# Patient Record
Sex: MALE | Race: WHITE | NOT HISPANIC OR LATINO | Employment: OTHER | URBAN - METROPOLITAN AREA
[De-identification: names, ages, dates, MRNs, and addresses within clinical notes are randomized per-mention and may not be internally consistent; named-entity substitution may affect disease eponyms.]

---

## 2019-02-20 LAB — TSH SERPL-ACNC: 0.64 UIU/ML (ref 0.35–4.94)

## 2021-02-25 ENCOUNTER — TRANSFERRED RECORDS (OUTPATIENT)
Dept: HEALTH INFORMATION MANAGEMENT | Facility: CLINIC | Age: 61
End: 2021-02-25

## 2021-03-06 ENCOUNTER — TRANSFERRED RECORDS (OUTPATIENT)
Dept: HEALTH INFORMATION MANAGEMENT | Facility: CLINIC | Age: 61
End: 2021-03-06

## 2022-02-01 ENCOUNTER — TRANSFERRED RECORDS (OUTPATIENT)
Dept: HEALTH INFORMATION MANAGEMENT | Facility: CLINIC | Age: 62
End: 2022-02-01

## 2022-11-23 ENCOUNTER — TRANSFERRED RECORDS (OUTPATIENT)
Dept: HEALTH INFORMATION MANAGEMENT | Facility: CLINIC | Age: 62
End: 2022-11-23

## 2022-11-30 ENCOUNTER — TRANSFERRED RECORDS (OUTPATIENT)
Dept: HEALTH INFORMATION MANAGEMENT | Facility: CLINIC | Age: 62
End: 2022-11-30

## 2022-11-30 LAB — EJECTION FRACTION: NORMAL %

## 2022-12-13 ENCOUNTER — TRANSFERRED RECORDS (OUTPATIENT)
Dept: HEALTH INFORMATION MANAGEMENT | Facility: CLINIC | Age: 62
End: 2022-12-13

## 2022-12-21 ENCOUNTER — TRANSFERRED RECORDS (OUTPATIENT)
Dept: HEALTH INFORMATION MANAGEMENT | Facility: CLINIC | Age: 62
End: 2022-12-21

## 2023-02-06 LAB
CHOLESTEROL (EXTERNAL): 131 MG/DL (ref 140–200)
HDLC SERPL-MCNC: 62 MG/DL
LDL CHOLESTEROL CALCULATED (EXTERNAL): 57 MG/DL
TRIGLYCERIDES (EXTERNAL): 58 MG/DL

## 2023-03-01 LAB — HEP C HIM: NORMAL

## 2023-04-17 LAB
ALT SERPL-CCNC: 36 U/L (ref 0–55)
AST SERPL-CCNC: 28 U/L (ref 5–45)

## 2023-04-24 ENCOUNTER — TRANSFERRED RECORDS (OUTPATIENT)
Dept: HEALTH INFORMATION MANAGEMENT | Facility: CLINIC | Age: 63
End: 2023-04-24

## 2023-04-24 ENCOUNTER — MEDICAL CORRESPONDENCE (OUTPATIENT)
Dept: HEALTH INFORMATION MANAGEMENT | Facility: CLINIC | Age: 63
End: 2023-04-24

## 2023-04-24 LAB
GLUCOSE (EXTERNAL): 195 MG/DL (ref 70–100)
POTASSIUM (EXTERNAL): 4.4 MEQ/L (ref 3.6–5.4)

## 2023-04-25 ENCOUNTER — TRANSCRIBE ORDERS (OUTPATIENT)
Dept: OTHER | Age: 63
End: 2023-04-25

## 2023-04-25 ENCOUNTER — TELEPHONE (OUTPATIENT)
Dept: GASTROENTEROLOGY | Facility: CLINIC | Age: 63
End: 2023-04-25

## 2023-04-25 DIAGNOSIS — K86.1 CHRONIC PANCREATITIS (H): Primary | ICD-10-CM

## 2023-04-25 DIAGNOSIS — I48.0 PAF (PAROXYSMAL ATRIAL FIBRILLATION) (H): ICD-10-CM

## 2023-04-25 DIAGNOSIS — K86.1 CHRONIC PANCREATITIS, UNSPECIFIED PANCREATITIS TYPE (H): Primary | ICD-10-CM

## 2023-04-25 DIAGNOSIS — I10 HYPERTENSION, UNSPECIFIED TYPE: ICD-10-CM

## 2023-04-25 DIAGNOSIS — R73.9 BLOOD GLUCOSE ELEVATED: ICD-10-CM

## 2023-04-25 DIAGNOSIS — K86.3 PANCREATIC PSEUDOCYST: ICD-10-CM

## 2023-04-25 DIAGNOSIS — E11.9 TYPE 2 DIABETES MELLITUS WITHOUT COMPLICATION, WITHOUT LONG-TERM CURRENT USE OF INSULIN (H): ICD-10-CM

## 2023-04-25 DIAGNOSIS — Z79.01 LONG TERM (CURRENT) USE OF ANTICOAGULANTS: ICD-10-CM

## 2023-04-25 DIAGNOSIS — G62.9 NEUROPATHY: ICD-10-CM

## 2023-04-25 NOTE — TELEPHONE ENCOUNTER
Advanced Endoscopy     Referring provider:Referred by: Dr Elly Dawkins [facility]  [address]  [place] [address]  Phone: [phone], Fax: [phone]      Referred to: [facility]     Provider Requested:  Dr. Ribeiro       Referral Received: 04/25/23       Records received: none     Images received: none    Evaluation for: K86.1 (ICD-10-CM) - Chronic pancreatitis, unspecified pancreatitis type (H)     Clinical History (per RN review):     63 year old male with hx of necrotizing pancreatitis dating back to 2018, diabetes type 2, A-fib, on anticoagulation and history of reactive hepatitis.  Hospitalized February 23 to March 3, 2023 for pancreatic pseudocysts, LAMs placed for drainage       Recent hospitalization April 4 to April 7, 2023  [place] [facility] to remove LAMS, admitted afterwards for pain control    Patient currently on Creon 6000 units, 1 capsule by mouth 3 times a day. Patient currently reports pain worse after eating, pain otherwise manageable.      Last CT done 3/23/23 showing resolved pancreatic fluid collection no additional imaging completed since that time:      UPPER ABDOMEN       Liver and bile ducts: No focal liver lesion. Portal and hepatic veins are patent. No biliary dilatation.       Gallbladder: Prior cholecystectomy.       Pancreas: Stable changes of cyst gastrostomy involving a chronic pancreatic body collection. There is been interval decompression of the collection with resolution of intrinsic gas. Residual collection is ill-defined and difficult to measure however measures approximately 18 x 39 x 26 mm (2/59 and 301/43), previously 27 x 76 x 80 mm. Unchanged atrophy and mild chronic ductal dilatation distal to the collection. Mild chronic inflammation about the pancreatic head and body is improved from prior. Inflammation along the superior aspect of the collection is improved. Stable appearance of multiple prominent peripancreatic  lymph nodes.     EUS 2/23/23                                                                                Impression:        EGD        1 - Normal esophagus and flat regular Z-line.        2 - A localized area of extrinsic compression in the gastric body        (posterior wall). Likely related to the underlying pseudocyst.        3 - Mild erythematous gastropathy. Biopsied.        4 - A localized area of erythematous duodenopathy in the duodenal bulb.        Could be related to the underlying pseudocyst and pancreatitis.        5 - Normal second portion of the duodenum.        EUS        1 - This was unintentionally limited exam given the indication of the        procedure as well as the presence of the large pseudocyst obscuring the        view of the pancreas.        2 - A large, at least 100 mm by 68 mm, pseudocyst was seen. This was        treated successfully by placement of a 15 mm x 10 mm AXIOS LAMS to        create a cystogastrostomy. One 7 Fr double pigtail plastic stents were        then placed through the LAMS to maintain stent patency and to protect        the contralateral gastric and pseudocyst walls.     EGD to remove LAMS 4/4/23                                                                            Impression:        1 - Normal esophagus and flat regular Z-line.        2 - Complete resolution of the previously described pseudocyst which was        evaluated endoscopically. Given these findings, the LAMS and the plastic        stent were removed.        3 - Congested and erythematous duodenal mucosa. No duodenal stenosis or        gastric outlet obstruction.        4 - Normal second portion of the duodenum.        5 - Post procedure CXR showed no evidence of perforation or free        peritoneal air (Ordered given post procedure pain).     MD review date: 05/10/23 Dr. Julien YEPEZ Decision for clinic consultation/Orders:       Will see him in clinic first with a new CT with IV contrast  of abdomen      Referral updates/Patient contacted: 5/10

## 2023-05-01 ENCOUNTER — DOCUMENTATION ONLY (OUTPATIENT)
Dept: GASTROENTEROLOGY | Facility: CLINIC | Age: 63
End: 2023-05-01

## 2023-05-01 NOTE — PROGRESS NOTES
Called [facility] to request records pertaining to recent referral.     Records Requested:  Evaluation for: K86.1 (ICD-10-CM) - Chronic pancreatitis, unspecified pancreatitis type (H)    Facility Information:  Referred by: Dr Elly Gandhi   [facility]   [address]   [place] [address]   Phone: [phone]  Fax: [phone] sk

## 2023-05-15 ENCOUNTER — TELEPHONE (OUTPATIENT)
Dept: GASTROENTEROLOGY | Facility: CLINIC | Age: 63
End: 2023-05-15

## 2023-05-15 NOTE — TELEPHONE ENCOUNTER
[facility] Call Center    Phone Message    May a detailed message be left on voicemail: yes     Reason for Call: Order(s): Other:   Reason for requested: CT   Date needed: ASAP  Provider name: Dr. Victoria   Pt is requesting a call back from the team please to discuss having the imaging done closer to home. Please advise. Thank you!      Action Taken: Message routed to:  [facility] ([facility] Panc and Bili    Travel Screening: Not Applicable

## 2023-05-16 ENCOUNTER — DOCUMENTATION ONLY (OUTPATIENT)
Dept: GASTROENTEROLOGY | Facility: CLINIC | Age: 63
End: 2023-05-16

## 2023-05-16 NOTE — TELEPHONE ENCOUNTER
Returned call to patient. He requests that imaging order be sent to [facility] in [place], [place]. Request routed to clinic support pool.     Appointment with Dr. Victoria arranged for 7/20/23.    Aislinn Hilario RN Care Coordinator

## 2023-05-16 NOTE — PROGRESS NOTES
Faxed imaging order per Patient request.     Imaging ordered by Dr. Victoria on 05/10/23:  CT Abdomen w contrast    Note: Images due to be completed between July 1 - 14    Facility Information:  [facility]  [facility] [place] [address]  Phone #: [phone]  Radiology Fax #:  [phone]      SK

## 2023-05-17 ENCOUNTER — TELEPHONE (OUTPATIENT)
Dept: GASTROENTEROLOGY | Facility: CLINIC | Age: 63
End: 2023-05-17

## 2023-05-17 NOTE — TELEPHONE ENCOUNTER
Signature can be found in the middle of the first page of the order. Provider signed orders on 05/10/2023.    Re-sent fax to provided number.       SK

## 2023-05-17 NOTE — PROGRESS NOTES
Re-faxed imaging order per telephone encounter request. Cleo from [facility] ([phone]) called to state that they could not locate the provider signature on the order.     Signature can be found in the middle of the first page of the order. Provider signed orders on 05/10/2023.     Re-sent fax to provided number.      Imaging ordered by Dr. Victoria on 05/10/23:  CT Abdomen w contrast     Note: Images due to be completed between July 1 - 14     Facility Information:  [facility]  [address] [place] [address]  Phone #: [phone]  Radiology Fax #:  [phone]  Fax #: [phone]     SK

## 2023-05-17 NOTE — TELEPHONE ENCOUNTER
[facility] Call Center    Phone Message    May a detailed message be left on voicemail: yes     Reason for Call: Other: Cleo from [facility] [phone]  called to inform that she received a fax requesting a ct scan for pt, but she can't find where provider signed it. Does not say electronic signature just says ordered by Dr Hill. No Pre Auth with insurance was found either.  Please re fax to Fax# [phone], this fax goes directly to her.    Action Taken: Other: ramesh pabon    Travel Screening: Not Applicable

## 2023-05-18 NOTE — PROGRESS NOTES
Called Cleo from [facility] ([phone]) and confirmed receipt of order.      Cleo requested phone number for ordering nurse, as they need clarification. Provided direct dial.     Imaging ordered by Dr. Victoria on 05/10/23:  CT Abdomen w contrast     Note: Images due to be completed between July 1 - 14     Facility Information:  [facility]  [address], [place] [address]  Phone #: [phone]  Radiology Fax #:  [phone]  Fax #: [phone]     SK

## 2023-06-19 DIAGNOSIS — K86.1 CHRONIC PANCREATITIS (H): Primary | ICD-10-CM

## 2023-06-19 NOTE — PROGRESS NOTES
Request for an updated order for CT abdomen and pelvis with contrast per performing provider.     Order faxed to [facility] in [place], [place]. Fax: [phone]  Attn: Cleo Hilario RN Care Coordinator

## 2023-06-21 ENCOUNTER — DOCUMENTATION ONLY (OUTPATIENT)
Dept: GASTROENTEROLOGY | Facility: CLINIC | Age: 63
End: 2023-06-21

## 2023-06-21 NOTE — PROGRESS NOTES
Received call from Cleo from [facility] ([phone]) who confirmed receipt of order.     Patient is scheduled to complete order on 07-10-23.     Imaging ordered by Dr. Guru Victoria on 06/19/23:  CT Abdomen w contrast     Facility Information:  [facility]  [address] [place] [address]  Phone #: [phone]  Radiology Fax #:  [phone]  Fax #: [phone]        SK

## 2023-06-21 NOTE — PROGRESS NOTES
Called Cleo from [facility] ([phone]) to confirm receipt of imaging order.     Left message with call-back number.    Faxed orders on 06- with signature.     Imaging ordered by Dr. Guru Victoria on 06/19/23:  CT Abdomen w contrast     Facility Information:  [facility]  [address] [place] [address]  Phone #: [phone]  Radiology Fax #:  [phone]  Fax #: [phone]       SK

## 2023-06-26 LAB — HBA1C MFR BLD: 5 % (ref 4.3–5.6)

## 2023-07-10 ENCOUNTER — TRANSFERRED RECORDS (OUTPATIENT)
Dept: MULTI SPECIALTY CLINIC | Facility: CLINIC | Age: 63
End: 2023-07-10
Payer: COMMERCIAL

## 2023-07-10 LAB
CREATININE (EXTERNAL): 1.2 MG/DL (ref 0.66–1.25)
GFR ESTIMATED (EXTERNAL): 69 ML/MIN/1.73M2
INR (EXTERNAL): 2.5 (ref 0–3)

## 2023-07-10 NOTE — PROGRESS NOTES
Called  to request images be pushed to [employer] PACS.     Images Requested:  CT ABDOMEN PELVIS WITH CONTRAST (07/10/2023 9:51 AM CDT)     Facility Information:   Radiology    [address] [place] [address]    [phone]          SK

## 2023-07-13 ENCOUNTER — DOCUMENTATION ONLY (OUTPATIENT)
Dept: GASTROENTEROLOGY | Facility: CLINIC | Age: 63
End: 2023-07-13
Payer: COMMERCIAL

## 2023-07-13 NOTE — PROGRESS NOTES
Called PT and confirmed Appointment.    Called to remind patient of their upcoming appointment with our GI clinic, on 07/20/23 at 11:00 AM with Dr. Guru Victoria. This appointment is scheduled as an in-person appt. Please arrive 15 minutes early to check in for your appointment. , if your appointment is virtual (video or telephone) you need to be in [place] for the visit. To reschedule or cancel patient to call [phone].      SK

## 2023-07-20 ENCOUNTER — PREP FOR PROCEDURE (OUTPATIENT)
Dept: GASTROENTEROLOGY | Facility: CLINIC | Age: 63
End: 2023-07-20

## 2023-07-20 ENCOUNTER — OFFICE VISIT (OUTPATIENT)
Dept: GASTROENTEROLOGY | Facility: CLINIC | Age: 63
End: 2023-07-20
Payer: COMMERCIAL

## 2023-07-20 ENCOUNTER — CARE COORDINATION (OUTPATIENT)
Dept: GASTROENTEROLOGY | Facility: CLINIC | Age: 63
End: 2023-07-20

## 2023-07-20 VITALS
HEIGHT: 69 IN | HEART RATE: 59 BPM | BODY MASS INDEX: 28.64 KG/M2 | WEIGHT: 193.4 LBS | SYSTOLIC BLOOD PRESSURE: 133 MMHG | OXYGEN SATURATION: 96 % | DIASTOLIC BLOOD PRESSURE: 74 MMHG

## 2023-07-20 DIAGNOSIS — E11.9 TYPE 2 DIABETES MELLITUS WITHOUT COMPLICATION, WITHOUT LONG-TERM CURRENT USE OF INSULIN (H): ICD-10-CM

## 2023-07-20 DIAGNOSIS — G62.9 NEUROPATHY: ICD-10-CM

## 2023-07-20 DIAGNOSIS — I10 HYPERTENSION, UNSPECIFIED TYPE: ICD-10-CM

## 2023-07-20 DIAGNOSIS — I48.0 PAF (PAROXYSMAL ATRIAL FIBRILLATION) (H): ICD-10-CM

## 2023-07-20 DIAGNOSIS — K86.1 CHRONIC PANCREATITIS, UNSPECIFIED PANCREATITIS TYPE (H): ICD-10-CM

## 2023-07-20 DIAGNOSIS — Z79.01 LONG TERM (CURRENT) USE OF ANTICOAGULANTS: ICD-10-CM

## 2023-07-20 DIAGNOSIS — K86.3 PANCREATIC PSEUDOCYST: Primary | ICD-10-CM

## 2023-07-20 DIAGNOSIS — R73.9 BLOOD GLUCOSE ELEVATED: ICD-10-CM

## 2023-07-20 DIAGNOSIS — K86.3 PANCREATIC PSEUDOCYST: ICD-10-CM

## 2023-07-20 PROCEDURE — 99204 OFFICE O/P NEW MOD 45 MIN: CPT | Mod: GC | Performed by: INTERNAL MEDICINE

## 2023-07-20 RX ORDER — BLOOD SUGAR DIAGNOSTIC
1 STRIP MISCELLANEOUS
COMMUNITY
Start: 2023-05-30

## 2023-07-20 RX ORDER — WARFARIN SODIUM 5 MG/1
TABLET ORAL
Status: ON HOLD | COMMUNITY
End: 2023-08-07

## 2023-07-20 RX ORDER — DILTIAZEM HYDROCHLORIDE 120 MG/1
120 CAPSULE, COATED, EXTENDED RELEASE ORAL DAILY
COMMUNITY
Start: 2023-05-27

## 2023-07-20 RX ORDER — METFORMIN HCL 500 MG
500 TABLET, EXTENDED RELEASE 24 HR ORAL
COMMUNITY
Start: 2023-06-26

## 2023-07-20 RX ORDER — MAGNESIUM OXIDE 400 MG/1
400 TABLET ORAL DAILY
COMMUNITY

## 2023-07-20 RX ORDER — AMLODIPINE BESYLATE 2.5 MG/1
TABLET ORAL
COMMUNITY
Start: 2023-06-26

## 2023-07-20 RX ORDER — GABAPENTIN 600 MG/1
1200 TABLET ORAL
Status: ON HOLD | COMMUNITY
Start: 2023-06-26 | End: 2023-08-07

## 2023-07-20 RX ORDER — PANCRELIPASE 30000; 6000; 19000 [USP'U]/1; [USP'U]/1; [USP'U]/1
CAPSULE, DELAYED RELEASE PELLETS ORAL
COMMUNITY
Start: 2023-04-24

## 2023-07-20 RX ORDER — FOLIC ACID 1 MG/1
TABLET ORAL
COMMUNITY
Start: 2023-04-25

## 2023-07-20 RX ORDER — MULTIPLE VITAMINS W/ MINERALS TAB 9MG-400MCG
1 TAB ORAL DAILY
COMMUNITY

## 2023-07-20 ASSESSMENT — PAIN SCALES - GENERAL: PAINLEVEL: MILD PAIN (3)

## 2023-07-20 NOTE — PATIENT INSTRUCTIONS
Maddy Smith,     Please see information below regarding your upcoming procedure with Dr. Victoria.     It is a pleasure being involved in your health care. Please call with any questions or concerns regarding your clinic visit.    Aislinn Hilario RN, BSN  Care Coordinator  Advanced Endoscopy   Phone: [phone]      Contacts post-consultation depending on your need:    Schedule Clinic Appointments            [phone]         OR Procedure Scheduling                             [phone]    For urgent/emergent questions after business hours, you may reach the on-call GI Fellow by contacting the [facility]  at [phone].    How do I schedule labs, imaging studies, or procedures that were ordered in clinic today?     Labs: To schedule lab appointment at the [facility], use [employer] or call [phone]. If you have a [facility] lab closer to home where you are regularly seen you can give them a call.     Procedures: If a colonoscopy, upper endoscopy, breath test, esophageal manometry, or pH impedence was ordered today, our endoscopy team will call you to schedule this. If you have not heard from our endoscopy team within a week, please call [phone] to schedule.     Imaging Studies: If you were scheduled for a CT scan, X-ray, MRI, ultrasound, HIDA scan or other imaging study, please call [phone] to have this scheduled.     Referral: If a referral to another specialty was ordered, expect a phone call or follow instructions above. If you have not heard from anyone regarding your referral in a week, please call our clinic to check the status.     How to I schedule a follow-up visit?  If you did not schedule a follow-up visit today, please call [phone] to schedule a follow-up office visit.   -----------------------------------------------------------------------------------------------------------------  Maddy Smith,     You are scheduled for EUS (Endoscopic  Ultrasound) for drainage of pancreatic pseudocyst on 8/7/23 with Dr. Guru Victoria at the [facility].     As the time of your procedure is subject to change, a pre-op nurse will call you 1-2 days prior to your scheduled date to let you know what time to report to the hospital and what time to stop eating and drinking. Generally speaking, patients are asked to stop eating solid foods 8 hours prior to arrival at the hospital. You may have clear liquids until 1 hour prior to arrival.     Your procedure will take place at:  [facility]  [address] [place] [address]    Parking Information:    parking is often the most convenient option for patients. The  station is located at the main hospital entrance on [facility] [address] [address] [facility]. Both  and self-parking are the same rates. Tips for  are not accepted. If you choose to self park, the Patient & Visitor Parking Ramp, located on [facility] is connected to the Medical Center via tunnel. Remember to bring your ticket  to the  for validation to receive reduced parking rates. Metered street parking is also available.    Enter through the front doors and let the  know that you are to report to Station 3C on the 3rd floor of the hospital.     Please ensure that you have completed a pre-op physical assessment with your Primary Care provider within 30 days of your scheduled procedure. If you are 65 or older, you should have an EKG as part of your pre-op assessment. Please have your PCP fax the record of this visit to [phone]. This is a requirement for anesthesia, and without it, your procedure may be cancelled. You may also call our Pre-operative Assessment Center (PAC) at [phone] to schedule a virtual visit.     If you take blood thinning medications: Discuss recommendations for holding medication with our clinic, and your  prescribing provider to be sure our policies prior to procedure are appropriate for you. *HOLD Warfarin for 5 full days prior to procedure, starting on 8/2/23.    If you are diabetic: Ask your doctor how much medication, if any, you should take on the day of your procedure. Insulin is commonly adjusted, as you will not be eating for a period of time.     While we no longer require routine COVID testing, you will be asked to test if you have COVID symptoms (cough, fever, body aches, etc.) or exposure to someone with COVID within 14 days of your scheduled procedure. We ask that you carefully monitor for symptoms prior to procedure and notify our clinic with concerns. You may wish to have a test on hand in the event that it is needed. Failure to test or a positive COVID result may result in a delay of procedure.     Please ensure that you have someone to drive you home and stay with you for 24 hours after your procedure. We also ask that you stay within 45 minutes of the hospital for the first 24 hours in case of emergency. If you need assistance to find lodging, you may contact our Accommodations team at: [phone] or [phone] (toll-free) or you may find a map showing hotels nearby on our website at: [email or web address]     We anticipate that you will go home on the same day of procedure. Rarely, you and/or your doctor may want to observe you overnight. Please prepare for this possibility.    Post Procedure:   Due to the nature of an endoscopic procedure, you are likely to experience a sore throat. This may last up to a week. Drink warm or cool beverages for comfort or utilize throat lozenges as needed.    If you experience the following symptoms, we recommend that you are seen in the [facility] Emergency Department:   Fevers over 101 +/- chills  Significant nausea/vomiting causing inability to take in fluids.  Severe pain, ongoing symptoms (pain, nausea) that cannot  "be controlled with prescribed or OTC medication.  Signs of dehydration (pale skin, low blood pressure, lightheadedness, dark urine, \"sticky\" skin when pinched, rapid heart rate, little to no urine output).  Please call our clinic at [phone] if you need to speak with a triage nurse.     Please feel free to reach out to me via [employer] or by phone if you have any questions or concerns.     Sincerely,     Aislinn Hilario RN, BSN  Care Coordinator  Advanced Endoscopy   Phone: [phone]        "

## 2023-07-20 NOTE — PROGRESS NOTES
Please assist in scheduling:     Procedure/Imaging/Clinic: EUS guided cystgastrostomy for endoscopic transluminal drainage of pseudocyst.  Physician: Julien  Timin23  Scope time: Provider average  Anesthesia: General  Dx: Pancreatic pseudocyst  Tier:3  Location: [facility]

## 2023-07-20 NOTE — PROGRESS NOTES
Please assist in scheduling:     Procedure/Imaging/Clinic: EUS guided cystgastrostomy for endoscopic transluminal drainage of pseudocyst.  Physician: Julien  Timin23  Scope time: Provider average  Anesthesia: General  Dx: Pancreatic pseudocyst  Tier:3  Location: [facility]     Comments: Discussed with patient in clinic. Agreeable to schedule for 23.    Called to discuss with patient.     Explained they will need a , someone to stay with them for 24 hours and should stay in town for 24 hours (within 45 min of Hospital) post procedure. Provided patient with number for accommodations team.    Patient needs to get pre-op physical completed. If outside  health system will need physical faxed to number [phone]   If you do not get a preop physical, your procedure could be cancelled, patient voiced understanding*    Preop Plan: Patient will arrange with PCP; provided with pre-op fax number.    Does patient have any history of gastric bypass/gastric surgery/altered panc/bili anatomy? No    Does patient have [employer] insurance?: No    Med Review    Blood thinner -  Warfarin. Discussed 5 day hold to begin 23  ASA - None  Diabetic - Metformin   Any meds by injection or mouth for weight loss or diabetes- No    Patient Education r/t procedure: Discussion / AVS    A pre-op nurse will call 1-2 days prior to the procedure.    NPO/Prep:   Adults and Children of all ages may consume solids up to 8 hours prior to arrival time - may consume clear liquids up to 1 hour prior to arrival time.    Verbalized understanding of all instructions. All questions answered.     Procedure order placed, message routed to OR .    Aislinn Hilario RN Care Coordinator

## 2023-07-20 NOTE — PROGRESS NOTES
"Chief Complaint   Patient presents with     New Patient       Vitals:    07/20/23 1109   BP: 133/74   BP Location: Left arm   Patient Position: Sitting   Cuff Size: Adult Regular   Pulse: 59   SpO2: 96%   Weight: 87.7 kg (193 lb 6.4 oz)   Height: 1.753 m (5' 9\")       Body mass index is 28.56 kg/m .    Edita Winston"

## 2023-07-20 NOTE — PROGRESS NOTES
GI CLINIC VISIT    CC/REFERRING PROVIDER:  Jennifer Ramirez  REASON FOR CONSULTATION: recurrent pancreatic pseudocyst    HPI: 63 year old male with PMHx of HTN, Afib on warfarin, DM2, RLS/peripheral neuropathy and acute necrotizing pancreatitis c/b pancreatic pseudocyst s/p cyst gastrostomy x2 referred to our clinic for chronic abdominal pain and recurrent pancreatic pseudocyst. He first developed pancreatitis in 2018 when he was hospitalized for over a month, noted to have necrotic collections and underwent stent placement in 2018 for drainage with subsequent removal. Fluid collection returned and patient underwent cyst gastrostomy with stent placement again in March of 2023, which was removed in April 2023. Since the procedures in March, the patient has continued to have chronic low grade epigastric pain, which he rates as a 3 out of 10 and states is always present. The pain is localized to the LUQ/epigastric region. Additionally, he has difficulty keeping food down - only eats 1 meal per day and ~2 times per week has postprandial vomiting. BM are regular and soft but only once every three days due to his reduced intake. His primary doctor started him on Creon for pancreatic enzyme replacement, has gained ~10 pounds over past several months. He also takes warfarin for Afib as well as diltiazem; metformin for diabetes (A1c 5.0% most recently, down from 8.2% in 2022); amlodipine for HTN; and gabapentin for neuropathy.    ROS: 10pt ROS performed and otherwise negative.    PERTINENT PAST MEDICAL/SURGICAL HISTORY:  No past medical history on file.  No past surgical history on file.  As noted above.    PERTINENT MEDICATIONS:  Current Outpatient Medications   Medication     amLODIPine (NORVASC) 2.5 MG tablet     blood glucose (ONETOUCH VERIO IQ) test strip     CREON 6000-63823 units CPEP per EC capsule     diltiazem ER COATED BEADS (CARDIZEM CD/CARTIA XT) 120 MG 24 hr capsule     folic acid (FOLVITE) 1 MG tablet  "    gabapentin (NEURONTIN) 600 MG tablet     magnesium oxide (MAG-OX) 400 MG tablet     metFORMIN (GLUCOPHAGE XR) 500 MG 24 hr tablet     multivitamin w/minerals (MULTI-VITAMIN) tablet     warfarin ANTICOAGULANT (COUMADIN) 5 MG tablet     No current facility-administered medications for this visit.     Medications reviewed with patient today, see Medication List/Assessment for details.  No other NSAID/anticoagulation reported by patient.  No other OTC/herbal/supplements reported by patient.       PHYSICAL EXAMINATION:  Vitals reviewed  /74 (BP Location: Left arm, Patient Position: Sitting, Cuff Size: Adult Regular)   Pulse 59   Ht 1.753 m (5' 9\")   Wt 87.7 kg (193 lb 6.4 oz)   SpO2 96%   BMI 28.56 kg/m    Wt Readings from Last 2 Encounters:   07/20/23 87.7 kg (193 lb 6.4 oz)     Gen: aaox3, cooperative, pleasant, not diaphoretic, nad  HEENT: ncat, anicteric,  Resp/CV without acute findings, not dyspneic/tachycardic, normal S1/S2, RRR, lungs clear bilaterally  Abd: TTP over left upper quadrant but nontender elsewhere, soft, nondistended  Ext: no c/c/e  Skin: warm, perfused, no jaundice  Neuro: grossly intact, no asterixis noted    PERTINENT STUDIES:  April 2023: BMP wnl, CBC notable for HgB of 13.1, lipase 94, LFT with AST 28, ALT 36, tbili 0.4  June 2023: A1c 5.0%  July 10 2023: INR 2.5    CT AP 7/10/23: reaccumulated pseudocyst measuring 60x45 mm that involves body of pancreas, stable atrophy of tail of pancreas      ASSESSMENT/PLAN: 63 year old male with PMHx of HTN, Afib on warfarin, DM2, RLS/peripheral neuropathy and acute necrotizing pancreatitis c/b pancreatic pseudocyst s/p cyst gastrostomy x2 referred to our clinic for chronic abdominal pain and recurrent pancreatic pseudocyst.    #Pancreatic pseudocyst, recurrent, s/p cyst gastrostomy x2  #Hx of acute necrotizing pancreatitis  #Disconnected pancreatic duct syndrome  Pt had episode of severe necrotizing pancreatitis in 2018 c/b pseudocyst " formation for which he underwent cyst gastrostomy with stent placement both in 2018 and 2023. Most recent stent was removed in April, with fluid collection returning and pain/PO intolerance symptoms persisting over the past three months. Strongly suspect that the patient has disconnected pancreatic duct syndrome and that the body/tail of pancreas are continuing to secrete fluid into this collection, and that the size of this collection is responsible for his pain and difficulty keeping food down.    Recommended repeat cyst gastrostomy with indefinite placement of plastic double pigtail stent to drain fluid collection and prevent it from forming again. Pt amenable to plan.  - Will schedule for early August.  - Discussed need for holding warfarin 5 days prior to procedure to normalize INR to < 1.5 with tentative plan to resume 72 hr after procedure  - As patient lives ~5 hours away, recommended he make arrangements to spend at least the first night post-procedure in the [facility] in case of any complication.     Patient seen and discussed with attending GI physician, Dr. Victoria.     Bijal Martinez MD, PhD  PGY1 Internal Medicine

## 2023-07-20 NOTE — LETTER
"    7/20/2023         RE: Luis Davis  [id number] [address] [facility] [address]        Dear Colleague,    Thank you for referring your patient, Luis Davis, to the [facility] [place]. Please see a copy of my visit note below.    Chief Complaint   Patient presents with    New Patient       Vitals:    07/20/23 1109   BP: 133/74   BP Location: Left arm   Patient Position: Sitting   Cuff Size: Adult Regular   Pulse: 59   SpO2: 96%   Weight: 87.7 kg (193 lb 6.4 oz)   Height: 1.753 m (5' 9\")       Body mass index is 28.56 kg/m .    Edita Winston      GI CLINIC VISIT    CC/REFERRING PROVIDER:  Jennifer Ramirez  REASON FOR CONSULTATION: recurrent pancreatic pseudocyst    HPI: 63 year old male with PMHx of HTN, Afib on warfarin, DM2, RLS/peripheral neuropathy and acute necrotizing pancreatitis c/b pancreatic pseudocyst s/p cyst gastrostomy x2 referred to our clinic for chronic abdominal pain and recurrent pancreatic pseudocyst. He first developed pancreatitis in 2018 when he was hospitalized for over a month, noted to have necrotic collections and underwent stent placement in 2018 for drainage with subsequent removal. Fluid collection returned and patient underwent cyst gastrostomy with stent placement again in March of 2023, which was removed in April 2023. Since the procedures in March, the patient has continued to have chronic low grade epigastric pain, which he rates as a 3 out of 10 and states is always present. The pain is localized to the LUQ/epigastric region. Additionally, he has difficulty keeping food down - only eats 1 meal per day and ~2 times per week has postprandial vomiting. BM are regular and soft but only once every three days due to his reduced intake. His primary doctor started him on Creon for pancreatic enzyme replacement, has gained ~10 pounds over past several months. He also takes warfarin for Afib as well as diltiazem; metformin for diabetes (A1c 5.0% most " "recently, down from 8.2% in 2022); amlodipine for HTN; and gabapentin for neuropathy.    ROS: 10pt ROS performed and otherwise negative.    PERTINENT PAST MEDICAL/SURGICAL HISTORY:  No past medical history on file.  No past surgical history on file.  As noted above.    PERTINENT MEDICATIONS:  Current Outpatient Medications   Medication    amLODIPine (NORVASC) 2.5 MG tablet    blood glucose (ONETOUCH VERIO IQ) test strip    CREON 6000-18007 units CPEP per EC capsule    diltiazem ER COATED BEADS (CARDIZEM CD/CARTIA XT) 120 MG 24 hr capsule    folic acid (FOLVITE) 1 MG tablet    gabapentin (NEURONTIN) 600 MG tablet    magnesium oxide (MAG-OX) 400 MG tablet    metFORMIN (GLUCOPHAGE XR) 500 MG 24 hr tablet    multivitamin w/minerals (MULTI-VITAMIN) tablet    warfarin ANTICOAGULANT (COUMADIN) 5 MG tablet     No current facility-administered medications for this visit.     Medications reviewed with patient today, see Medication List/Assessment for details.  No other NSAID/anticoagulation reported by patient.  No other OTC/herbal/supplements reported by patient.       PHYSICAL EXAMINATION:  Vitals reviewed  /74 (BP Location: Left arm, Patient Position: Sitting, Cuff Size: Adult Regular)   Pulse 59   Ht 1.753 m (5' 9\")   Wt 87.7 kg (193 lb 6.4 oz)   SpO2 96%   BMI 28.56 kg/m    Wt Readings from Last 2 Encounters:   07/20/23 87.7 kg (193 lb 6.4 oz)     Gen: aaox3, cooperative, pleasant, not diaphoretic, nad  HEENT: ncat, anicteric,  Resp/CV without acute findings, not dyspneic/tachycardic, normal S1/S2, RRR, lungs clear bilaterally  Abd: TTP over left upper quadrant but nontender elsewhere, soft, nondistended  Ext: no c/c/e  Skin: warm, perfused, no jaundice  Neuro: grossly intact, no asterixis noted    PERTINENT STUDIES:  April 2023: BMP wnl, CBC notable for HgB of 13.1, lipase 94, LFT with AST 28, ALT 36, tbili 0.4  June 2023: A1c 5.0%  July 10 2023: INR 2.5    CT AP 7/10/23: reaccumulated pseudocyst measuring " 60x45 mm that involves body of pancreas, stable atrophy of tail of pancreas      ASSESSMENT/PLAN: 63 year old male with PMHx of HTN, Afib on warfarin, DM2, RLS/peripheral neuropathy and acute necrotizing pancreatitis c/b pancreatic pseudocyst s/p cyst gastrostomy x2 referred to our clinic for chronic abdominal pain and recurrent pancreatic pseudocyst.    #Pancreatic pseudocyst, recurrent, s/p cyst gastrostomy x2  #Hx of acute necrotizing pancreatitis  #Disconnected pancreatic duct syndrome  Pt had episode of severe necrotizing pancreatitis in 2018 c/b pseudocyst formation for which he underwent cyst gastrostomy with stent placement both in 2018 and 2023. Most recent stent was removed in April, with fluid collection returning and pain/PO intolerance symptoms persisting over the past three months. Strongly suspect that the patient has disconnected pancreatic duct syndrome and that the body/tail of pancreas are continuing to secrete fluid into this collection, and that the size of this collection is responsible for his pain and difficulty keeping food down.    Recommended repeat cyst gastrostomy with indefinite placement of plastic double pigtail stent to drain fluid collection and prevent it from forming again. Pt amenable to plan.  - Will schedule for early August.  - Discussed need for holding warfarin 5 days prior to procedure to normalize INR to < 1.5 with tentative plan to resume 72 hr after procedure  - As patient lives ~5 hours away, recommended he make arrangements to spend at least the first night post-procedure in the [facility] in case of any complication.     Patient seen and discussed with attending GI physician, Dr. Victoria.     Bijal Martinez MD, PhD  PGY1 Internal Medicine    Attestation signed by Guru Luz Victoria MD at 7/20/2023 12:35 PM:  I performed a history and physical examination of the above patient and discussed the management with Dr. Martinez on 7/20/2023.  I reviewed the note and there are no changes to the past medical, family or social history.  A complete 10 point review of systems was obtained. Please see the HPI for pertinent positives and negatives. All other systems were reviewed and were found to be negative. I agree with the documented findings and plan of care as outlined with the following addition    - Pt likely has recurrent pancreatic fluid collection or pseudocyst in the setting of disconnected pancreatic duct syndrome after the cystenterostomy stent was removed.  -He is currently symptomatic with abdominal pain early satiety and is able to tolerate only 1 meal a day  - Will recommend EUS guided cystgastrostomy for endoscopic transluminal drainage of pseudocyst.  -Patient will need to hold Coumadin for 5 days prior to procedure.  Discussed in detail risks of the procedure including risk of general anesthesia, risk of bleeding, infection, perforation.  -We would expect to place double-pigtail plastic stents which will remain in situ indefinitely  -Discussed with patient risk of post pancreatitis diabetes up to 27% in series of necrotizing pancreatitis patient.  Majority of these patients ended up needing insulin Will recommend checking HbA1c twice a year  -We will recommend patient to continue pancreatic enzyme replacement therapy with Creon 3 capsules with meals and 2 capsules with snacks      45  minutes spent on the date of the encounter doing chart review, review of outside records, review of test results, interpretation of tests, patient visit, documentation and discussion with other provider(s)    Dr Guru Victoria  Associate Professor of Medicine  Gastroenterology, Pancreas-Biliary diseases  [phone]         Again, thank you for allowing me to participate in the care of your patient.      Sincerely,    Guru Julien MD

## 2023-07-24 ENCOUNTER — TRANSFERRED RECORDS (OUTPATIENT)
Dept: HEALTH INFORMATION MANAGEMENT | Facility: CLINIC | Age: 63
End: 2023-07-24
Payer: COMMERCIAL

## 2023-07-24 LAB
ALT SERPL-CCNC: 37 U/L
AST SERPL-CCNC: 38 U/L (ref 17–59)
CREATININE (EXTERNAL): 1 MG/DL (ref 0.66–1.25)
GFR ESTIMATED (EXTERNAL): 86 ML/MIN/1.73M2
GLUCOSE (EXTERNAL): 120 MG/DL (ref 70–100)
POTASSIUM (EXTERNAL): 4.5 MEQ/L (ref 3.6–5.4)

## 2023-08-07 ENCOUNTER — HOSPITAL ENCOUNTER (INPATIENT)
Facility: CLINIC | Age: 63
LOS: 1 days | Discharge: HOME | End: 2023-08-09
Attending: INTERNAL MEDICINE | Admitting: INTERNAL MEDICINE
Payer: COMMERCIAL

## 2023-08-07 ENCOUNTER — APPOINTMENT (OUTPATIENT)
Dept: GENERAL RADIOLOGY | Facility: CLINIC | Age: 63
End: 2023-08-07
Attending: INTERNAL MEDICINE
Payer: COMMERCIAL

## 2023-08-07 ENCOUNTER — ANESTHESIA EVENT (OUTPATIENT)
Dept: SURGERY | Facility: CLINIC | Age: 63
End: 2023-08-07
Payer: COMMERCIAL

## 2023-08-07 ENCOUNTER — ANESTHESIA (OUTPATIENT)
Dept: SURGERY | Facility: CLINIC | Age: 63
End: 2023-08-07
Payer: COMMERCIAL

## 2023-08-07 ENCOUNTER — APPOINTMENT (OUTPATIENT)
Dept: CT IMAGING | Facility: CLINIC | Age: 63
End: 2023-08-07
Attending: INTERNAL MEDICINE
Payer: COMMERCIAL

## 2023-08-07 DIAGNOSIS — K85.90 POST-ERCP ACUTE PANCREATITIS: Primary | ICD-10-CM

## 2023-08-07 DIAGNOSIS — K91.89 POST-ERCP ACUTE PANCREATITIS: Primary | ICD-10-CM

## 2023-08-07 LAB
ALBUMIN SERPL BCG-MCNC: 4.2 G/DL (ref 3.5–5.2)
ANION GAP SERPL CALCULATED.3IONS-SCNC: 11 MMOL/L (ref 7–15)
BUN SERPL-MCNC: 16.2 MG/DL (ref 8–23)
CALCIUM SERPL-MCNC: 9 MG/DL (ref 8.8–10.2)
CHLORIDE SERPL-SCNC: 106 MMOL/L (ref 98–107)
CREAT SERPL-MCNC: 1.11 MG/DL (ref 0.67–1.17)
DEPRECATED HCO3 PLAS-SCNC: 22 MMOL/L (ref 22–29)
GFR SERPL CREATININE-BSD FRML MDRD: 76 ML/MIN/1.73M2
GLUCOSE BLDC GLUCOMTR-MCNC: 103 MG/DL (ref 70–99)
GLUCOSE BLDC GLUCOMTR-MCNC: 112 MG/DL (ref 70–99)
GLUCOSE BLDC GLUCOMTR-MCNC: 172 MG/DL (ref 70–99)
GLUCOSE SERPL-MCNC: 134 MG/DL (ref 70–99)
PHOSPHATE SERPL-MCNC: 3.1 MG/DL (ref 2.5–4.5)
POTASSIUM SERPL-SCNC: 4.2 MMOL/L (ref 3.4–5.3)
SODIUM SERPL-SCNC: 139 MMOL/L (ref 136–145)
UPPER EUS: NORMAL

## 2023-08-07 PROCEDURE — 258N000003 HC RX IP 258 OP 636

## 2023-08-07 PROCEDURE — 250N000011 HC RX IP 250 OP 636

## 2023-08-07 PROCEDURE — 250N000011 HC RX IP 250 OP 636: Mod: JZ

## 2023-08-07 PROCEDURE — 250N000013 HC RX MED GY IP 250 OP 250 PS 637: Performed by: STUDENT IN AN ORGANIZED HEALTH CARE EDUCATION/TRAINING PROGRAM

## 2023-08-07 PROCEDURE — 250N000011 HC RX IP 250 OP 636: Performed by: ANESTHESIOLOGY

## 2023-08-07 PROCEDURE — 250N000009 HC RX 250: Performed by: ANESTHESIOLOGY

## 2023-08-07 PROCEDURE — 272N000001 HC OR GENERAL SUPPLY STERILE: Performed by: INTERNAL MEDICINE

## 2023-08-07 PROCEDURE — 0F9G80Z DRAINAGE OF PANCREAS WITH DRAINAGE DEVICE, VIA NATURAL OR ARTIFICIAL OPENING ENDOSCOPIC: ICD-10-PCS | Performed by: INTERNAL MEDICINE

## 2023-08-07 PROCEDURE — 250N000025 HC SEVOFLURANE, PER MIN: Performed by: INTERNAL MEDICINE

## 2023-08-07 PROCEDURE — 370N000017 HC ANESTHESIA TECHNICAL FEE, PER MIN: Performed by: INTERNAL MEDICINE

## 2023-08-07 PROCEDURE — 258N000003 HC RX IP 258 OP 636: Performed by: STUDENT IN AN ORGANIZED HEALTH CARE EDUCATION/TRAINING PROGRAM

## 2023-08-07 PROCEDURE — 74177 CT ABD & PELVIS W/CONTRAST: CPT

## 2023-08-07 PROCEDURE — 82962 GLUCOSE BLOOD TEST: CPT

## 2023-08-07 PROCEDURE — 999N000181 XR SURGERY CARM FLUORO GREATER THAN 5 MIN W STILLS: Mod: TC

## 2023-08-07 PROCEDURE — 710N000010 HC RECOVERY PHASE 1, LEVEL 2, PER MIN: Performed by: INTERNAL MEDICINE

## 2023-08-07 PROCEDURE — 250N000009 HC RX 250

## 2023-08-07 PROCEDURE — 999N000141 HC STATISTIC PRE-PROCEDURE NURSING ASSESSMENT: Performed by: INTERNAL MEDICINE

## 2023-08-07 PROCEDURE — C1726 CATH, BAL DIL, NON-VASCULAR: HCPCS | Performed by: INTERNAL MEDICINE

## 2023-08-07 PROCEDURE — 250N000011 HC RX IP 250 OP 636: Mod: JZ | Performed by: ANESTHESIOLOGY

## 2023-08-07 PROCEDURE — 360N000075 HC SURGERY LEVEL 2, PER MIN: Performed by: INTERNAL MEDICINE

## 2023-08-07 PROCEDURE — 74177 CT ABD & PELVIS W/CONTRAST: CPT | Mod: 26 | Performed by: RADIOLOGY

## 2023-08-07 PROCEDURE — 99223 1ST HOSP IP/OBS HIGH 75: CPT | Performed by: STUDENT IN AN ORGANIZED HEALTH CARE EDUCATION/TRAINING PROGRAM

## 2023-08-07 PROCEDURE — C1769 GUIDE WIRE: HCPCS | Performed by: INTERNAL MEDICINE

## 2023-08-07 PROCEDURE — 80069 RENAL FUNCTION PANEL: CPT | Performed by: INTERNAL MEDICINE

## 2023-08-07 PROCEDURE — 255N000002 HC RX 255 OP 636: Mod: JZ | Performed by: INTERNAL MEDICINE

## 2023-08-07 PROCEDURE — C1877 STENT, NON-COAT/COV W/O DEL: HCPCS | Performed by: INTERNAL MEDICINE

## 2023-08-07 RX ORDER — FENTANYL CITRATE 50 UG/ML
25 INJECTION, SOLUTION INTRAMUSCULAR; INTRAVENOUS EVERY 5 MIN PRN
Status: DISCONTINUED | OUTPATIENT
Start: 2023-08-07 | End: 2023-08-07

## 2023-08-07 RX ORDER — NALOXONE HYDROCHLORIDE 0.4 MG/ML
0.2 INJECTION, SOLUTION INTRAMUSCULAR; INTRAVENOUS; SUBCUTANEOUS
Status: CANCELLED | OUTPATIENT
Start: 2023-08-07

## 2023-08-07 RX ORDER — FLUMAZENIL 0.1 MG/ML
0.2 INJECTION, SOLUTION INTRAVENOUS
Status: CANCELLED | OUTPATIENT
Start: 2023-08-07 | End: 2023-08-08

## 2023-08-07 RX ORDER — SODIUM CHLORIDE, SODIUM LACTATE, POTASSIUM CHLORIDE, CALCIUM CHLORIDE 600; 310; 30; 20 MG/100ML; MG/100ML; MG/100ML; MG/100ML
INJECTION, SOLUTION INTRAVENOUS CONTINUOUS
Status: DISCONTINUED | OUTPATIENT
Start: 2023-08-07 | End: 2023-08-07

## 2023-08-07 RX ORDER — FENTANYL CITRATE 50 UG/ML
INJECTION, SOLUTION INTRAMUSCULAR; INTRAVENOUS PRN
Status: DISCONTINUED | OUTPATIENT
Start: 2023-08-07 | End: 2023-08-07

## 2023-08-07 RX ORDER — NALOXONE HYDROCHLORIDE 0.4 MG/ML
0.4 INJECTION, SOLUTION INTRAMUSCULAR; INTRAVENOUS; SUBCUTANEOUS
Status: DISCONTINUED | OUTPATIENT
Start: 2023-08-07 | End: 2023-08-09 | Stop reason: HOSPADM

## 2023-08-07 RX ORDER — HYDROMORPHONE HCL IN WATER/PF 6 MG/30 ML
0.2 PATIENT CONTROLLED ANALGESIA SYRINGE INTRAVENOUS EVERY 5 MIN PRN
Status: DISCONTINUED | OUTPATIENT
Start: 2023-08-07 | End: 2023-08-07

## 2023-08-07 RX ORDER — EPHEDRINE SULFATE 50 MG/ML
INJECTION, SOLUTION INTRAMUSCULAR; INTRAVENOUS; SUBCUTANEOUS PRN
Status: DISCONTINUED | OUTPATIENT
Start: 2023-08-07 | End: 2023-08-07

## 2023-08-07 RX ORDER — LIDOCAINE HYDROCHLORIDE 20 MG/ML
INJECTION, SOLUTION INFILTRATION; PERINEURAL PRN
Status: DISCONTINUED | OUTPATIENT
Start: 2023-08-07 | End: 2023-08-07

## 2023-08-07 RX ORDER — PROPOFOL 10 MG/ML
INJECTION, EMULSION INTRAVENOUS PRN
Status: DISCONTINUED | OUTPATIENT
Start: 2023-08-07 | End: 2023-08-07

## 2023-08-07 RX ORDER — PROCHLORPERAZINE 25 MG
25 SUPPOSITORY, RECTAL RECTAL EVERY 12 HOURS PRN
Status: DISCONTINUED | OUTPATIENT
Start: 2023-08-07 | End: 2023-08-09 | Stop reason: HOSPADM

## 2023-08-07 RX ORDER — GABAPENTIN 600 MG/1
1200 TABLET ORAL 2 TIMES DAILY
COMMUNITY

## 2023-08-07 RX ORDER — NALOXONE HYDROCHLORIDE 0.4 MG/ML
0.2 INJECTION, SOLUTION INTRAMUSCULAR; INTRAVENOUS; SUBCUTANEOUS
Status: DISCONTINUED | OUTPATIENT
Start: 2023-08-07 | End: 2023-08-09 | Stop reason: HOSPADM

## 2023-08-07 RX ORDER — ONDANSETRON 2 MG/ML
4 INJECTION INTRAMUSCULAR; INTRAVENOUS EVERY 6 HOURS PRN
Status: CANCELLED | OUTPATIENT
Start: 2023-08-07

## 2023-08-07 RX ORDER — LANOLIN ALCOHOL/MO/W.PET/CERES
400 CREAM (GRAM) TOPICAL DAILY
Status: DISCONTINUED | OUTPATIENT
Start: 2023-08-08 | End: 2023-08-09 | Stop reason: HOSPADM

## 2023-08-07 RX ORDER — DEXAMETHASONE SODIUM PHOSPHATE 4 MG/ML
INJECTION, SOLUTION INTRA-ARTICULAR; INTRALESIONAL; INTRAMUSCULAR; INTRAVENOUS; SOFT TISSUE PRN
Status: DISCONTINUED | OUTPATIENT
Start: 2023-08-07 | End: 2023-08-07

## 2023-08-07 RX ORDER — PIPERACILLIN SODIUM, TAZOBACTAM SODIUM 3; .375 G/15ML; G/15ML
INJECTION, POWDER, LYOPHILIZED, FOR SOLUTION INTRAVENOUS PRN
Status: DISCONTINUED | OUTPATIENT
Start: 2023-08-07 | End: 2023-08-07

## 2023-08-07 RX ORDER — ONDANSETRON 2 MG/ML
4 INJECTION INTRAMUSCULAR; INTRAVENOUS EVERY 6 HOURS PRN
Status: DISCONTINUED | OUTPATIENT
Start: 2023-08-07 | End: 2023-08-09 | Stop reason: HOSPADM

## 2023-08-07 RX ORDER — SCOLOPAMINE TRANSDERMAL SYSTEM 1 MG/1
1 PATCH, EXTENDED RELEASE TRANSDERMAL ONCE
Status: COMPLETED | OUTPATIENT
Start: 2023-08-07 | End: 2023-08-08

## 2023-08-07 RX ORDER — MULTIPLE VITAMINS W/ MINERALS TAB 9MG-400MCG
1 TAB ORAL DAILY
Status: DISCONTINUED | OUTPATIENT
Start: 2023-08-08 | End: 2023-08-09 | Stop reason: HOSPADM

## 2023-08-07 RX ORDER — LIDOCAINE 40 MG/G
CREAM TOPICAL
Status: DISCONTINUED | OUTPATIENT
Start: 2023-08-07 | End: 2023-08-07 | Stop reason: HOSPADM

## 2023-08-07 RX ORDER — ONDANSETRON 4 MG/1
4 TABLET, ORALLY DISINTEGRATING ORAL EVERY 6 HOURS PRN
Status: DISCONTINUED | OUTPATIENT
Start: 2023-08-07 | End: 2023-08-09 | Stop reason: HOSPADM

## 2023-08-07 RX ORDER — GABAPENTIN 400 MG/1
1200 CAPSULE ORAL 2 TIMES DAILY
Status: DISCONTINUED | OUTPATIENT
Start: 2023-08-07 | End: 2023-08-09 | Stop reason: HOSPADM

## 2023-08-07 RX ORDER — HYDROMORPHONE HCL IN WATER/PF 6 MG/30 ML
0.4 PATIENT CONTROLLED ANALGESIA SYRINGE INTRAVENOUS EVERY 5 MIN PRN
Status: DISCONTINUED | OUTPATIENT
Start: 2023-08-07 | End: 2023-08-07

## 2023-08-07 RX ORDER — ACETAMINOPHEN 325 MG/1
975 TABLET ORAL EVERY 6 HOURS PRN
Status: DISCONTINUED | OUTPATIENT
Start: 2023-08-07 | End: 2023-08-09 | Stop reason: HOSPADM

## 2023-08-07 RX ORDER — ONDANSETRON 2 MG/ML
INJECTION INTRAMUSCULAR; INTRAVENOUS PRN
Status: DISCONTINUED | OUTPATIENT
Start: 2023-08-07 | End: 2023-08-07

## 2023-08-07 RX ORDER — NICOTINE POLACRILEX 4 MG
15-30 LOZENGE BUCCAL
Status: DISCONTINUED | OUTPATIENT
Start: 2023-08-07 | End: 2023-08-09 | Stop reason: HOSPADM

## 2023-08-07 RX ORDER — IOPAMIDOL 755 MG/ML
110 INJECTION, SOLUTION INTRAVASCULAR ONCE
Status: COMPLETED | OUTPATIENT
Start: 2023-08-07 | End: 2023-08-07

## 2023-08-07 RX ORDER — OXYCODONE HYDROCHLORIDE 5 MG/1
5-10 TABLET ORAL EVERY 6 HOURS PRN
Status: DISCONTINUED | OUTPATIENT
Start: 2023-08-07 | End: 2023-08-09 | Stop reason: HOSPADM

## 2023-08-07 RX ORDER — DEXTROSE MONOHYDRATE 25 G/50ML
25-50 INJECTION, SOLUTION INTRAVENOUS
Status: DISCONTINUED | OUTPATIENT
Start: 2023-08-07 | End: 2023-08-09 | Stop reason: HOSPADM

## 2023-08-07 RX ORDER — ONDANSETRON 4 MG/1
4 TABLET, ORALLY DISINTEGRATING ORAL EVERY 30 MIN PRN
Status: COMPLETED | OUTPATIENT
Start: 2023-08-07 | End: 2023-08-07

## 2023-08-07 RX ORDER — SODIUM CHLORIDE, SODIUM LACTATE, POTASSIUM CHLORIDE, CALCIUM CHLORIDE 600; 310; 30; 20 MG/100ML; MG/100ML; MG/100ML; MG/100ML
INJECTION, SOLUTION INTRAVENOUS CONTINUOUS
Status: DISCONTINUED | OUTPATIENT
Start: 2023-08-07 | End: 2023-08-08

## 2023-08-07 RX ORDER — HYDROMORPHONE HCL IN WATER/PF 6 MG/30 ML
.2-.4 PATIENT CONTROLLED ANALGESIA SYRINGE INTRAVENOUS EVERY 30 MIN PRN
Status: DISCONTINUED | OUTPATIENT
Start: 2023-08-07 | End: 2023-08-07

## 2023-08-07 RX ORDER — HYDROMORPHONE HCL IN WATER/PF 6 MG/30 ML
0.3 PATIENT CONTROLLED ANALGESIA SYRINGE INTRAVENOUS EVERY 6 HOURS PRN
Status: DISCONTINUED | OUTPATIENT
Start: 2023-08-07 | End: 2023-08-09 | Stop reason: HOSPADM

## 2023-08-07 RX ORDER — MAGNESIUM OXIDE 400 MG/1
400 TABLET ORAL DAILY
Status: DISCONTINUED | OUTPATIENT
Start: 2023-08-08 | End: 2023-08-09 | Stop reason: HOSPADM

## 2023-08-07 RX ORDER — NALOXONE HYDROCHLORIDE 0.4 MG/ML
0.4 INJECTION, SOLUTION INTRAMUSCULAR; INTRAVENOUS; SUBCUTANEOUS
Status: CANCELLED | OUTPATIENT
Start: 2023-08-07

## 2023-08-07 RX ORDER — METFORMIN HCL 500 MG
500 TABLET, EXTENDED RELEASE 24 HR ORAL
Status: DISCONTINUED | OUTPATIENT
Start: 2023-08-07 | End: 2023-08-09 | Stop reason: HOSPADM

## 2023-08-07 RX ORDER — PROCHLORPERAZINE MALEATE 5 MG
10 TABLET ORAL EVERY 6 HOURS PRN
Status: DISCONTINUED | OUTPATIENT
Start: 2023-08-07 | End: 2023-08-09 | Stop reason: HOSPADM

## 2023-08-07 RX ORDER — FENTANYL CITRATE 50 UG/ML
50 INJECTION, SOLUTION INTRAMUSCULAR; INTRAVENOUS EVERY 5 MIN PRN
Status: DISCONTINUED | OUTPATIENT
Start: 2023-08-07 | End: 2023-08-07

## 2023-08-07 RX ORDER — AMLODIPINE BESYLATE 2.5 MG/1
2.5 TABLET ORAL AT BEDTIME
Status: DISCONTINUED | OUTPATIENT
Start: 2023-08-07 | End: 2023-08-09 | Stop reason: HOSPADM

## 2023-08-07 RX ORDER — SODIUM CHLORIDE, SODIUM LACTATE, POTASSIUM CHLORIDE, CALCIUM CHLORIDE 600; 310; 30; 20 MG/100ML; MG/100ML; MG/100ML; MG/100ML
INJECTION, SOLUTION INTRAVENOUS CONTINUOUS PRN
Status: DISCONTINUED | OUTPATIENT
Start: 2023-08-07 | End: 2023-08-07

## 2023-08-07 RX ORDER — LIDOCAINE 40 MG/G
CREAM TOPICAL
Status: DISCONTINUED | OUTPATIENT
Start: 2023-08-07 | End: 2023-08-09 | Stop reason: HOSPADM

## 2023-08-07 RX ORDER — DILTIAZEM HYDROCHLORIDE 120 MG/1
120 CAPSULE, COATED, EXTENDED RELEASE ORAL DAILY
Status: DISCONTINUED | OUTPATIENT
Start: 2023-08-08 | End: 2023-08-09 | Stop reason: HOSPADM

## 2023-08-07 RX ORDER — IOPAMIDOL 510 MG/ML
INJECTION, SOLUTION INTRAVASCULAR PRN
Status: DISCONTINUED | OUTPATIENT
Start: 2023-08-07 | End: 2023-08-07 | Stop reason: HOSPADM

## 2023-08-07 RX ORDER — ONDANSETRON 2 MG/ML
4 INJECTION INTRAMUSCULAR; INTRAVENOUS EVERY 30 MIN PRN
Status: COMPLETED | OUTPATIENT
Start: 2023-08-07 | End: 2023-08-07

## 2023-08-07 RX ORDER — ONDANSETRON 4 MG/1
4 TABLET, ORALLY DISINTEGRATING ORAL EVERY 6 HOURS PRN
Status: CANCELLED | OUTPATIENT
Start: 2023-08-07

## 2023-08-07 RX ADMIN — PIPERACILLIN AND TAZOBACTAM 3.38 G: 3; .375 INJECTION, POWDER, FOR SOLUTION INTRAVENOUS at 14:49

## 2023-08-07 RX ADMIN — HYDROMORPHONE HYDROCHLORIDE 0.4 MG: 0.2 INJECTION, SOLUTION INTRAMUSCULAR; INTRAVENOUS; SUBCUTANEOUS at 18:03

## 2023-08-07 RX ADMIN — HYDROMORPHONE HYDROCHLORIDE 0.4 MG: 0.2 INJECTION, SOLUTION INTRAMUSCULAR; INTRAVENOUS; SUBCUTANEOUS at 16:51

## 2023-08-07 RX ADMIN — SODIUM CHLORIDE, POTASSIUM CHLORIDE, SODIUM LACTATE AND CALCIUM CHLORIDE: 600; 310; 30; 20 INJECTION, SOLUTION INTRAVENOUS at 14:40

## 2023-08-07 RX ADMIN — LIDOCAINE HYDROCHLORIDE 100 MG: 20 INJECTION, SOLUTION INFILTRATION; PERINEURAL at 14:44

## 2023-08-07 RX ADMIN — FENTANYL CITRATE 50 MCG: 50 INJECTION, SOLUTION INTRAMUSCULAR; INTRAVENOUS at 16:17

## 2023-08-07 RX ADMIN — FENTANYL CITRATE 50 MCG: 50 INJECTION, SOLUTION INTRAMUSCULAR; INTRAVENOUS at 16:23

## 2023-08-07 RX ADMIN — PHENYLEPHRINE HYDROCHLORIDE 100 MCG: 10 INJECTION INTRAVENOUS at 15:18

## 2023-08-07 RX ADMIN — SODIUM CHLORIDE, POTASSIUM CHLORIDE, SODIUM LACTATE AND CALCIUM CHLORIDE: 600; 310; 30; 20 INJECTION, SOLUTION INTRAVENOUS at 22:55

## 2023-08-07 RX ADMIN — HYDROMORPHONE HYDROCHLORIDE 0.4 MG: 0.2 INJECTION, SOLUTION INTRAMUSCULAR; INTRAVENOUS; SUBCUTANEOUS at 16:36

## 2023-08-07 RX ADMIN — PHENYLEPHRINE HYDROCHLORIDE 100 MCG: 10 INJECTION INTRAVENOUS at 14:57

## 2023-08-07 RX ADMIN — ONDANSETRON 4 MG: 2 INJECTION INTRAMUSCULAR; INTRAVENOUS at 19:36

## 2023-08-07 RX ADMIN — PROPOFOL 150 MG: 10 INJECTION, EMULSION INTRAVENOUS at 14:44

## 2023-08-07 RX ADMIN — PHENYLEPHRINE HYDROCHLORIDE 100 MCG: 10 INJECTION INTRAVENOUS at 14:58

## 2023-08-07 RX ADMIN — DEXAMETHASONE SODIUM PHOSPHATE 4 MG: 4 INJECTION, SOLUTION INTRA-ARTICULAR; INTRALESIONAL; INTRAMUSCULAR; INTRAVENOUS; SOFT TISSUE at 14:55

## 2023-08-07 RX ADMIN — ONDANSETRON 4 MG: 2 INJECTION INTRAMUSCULAR; INTRAVENOUS at 15:46

## 2023-08-07 RX ADMIN — IOPAMIDOL 110 ML: 755 INJECTION, SOLUTION INTRAVENOUS at 19:24

## 2023-08-07 RX ADMIN — SUGAMMADEX 200 MG: 100 INJECTION, SOLUTION INTRAVENOUS at 15:45

## 2023-08-07 RX ADMIN — GABAPENTIN 1200 MG: 400 CAPSULE ORAL at 22:29

## 2023-08-07 RX ADMIN — FENTANYL CITRATE 100 MCG: 50 INJECTION, SOLUTION INTRAMUSCULAR; INTRAVENOUS at 14:42

## 2023-08-07 RX ADMIN — PHENYLEPHRINE HYDROCHLORIDE 100 MCG: 10 INJECTION INTRAVENOUS at 15:26

## 2023-08-07 RX ADMIN — HYDROMORPHONE HYDROCHLORIDE 0.4 MG: 0.2 INJECTION, SOLUTION INTRAMUSCULAR; INTRAVENOUS; SUBCUTANEOUS at 18:38

## 2023-08-07 RX ADMIN — EPHEDRINE SULFATE 5 MG: 5 INJECTION INTRAVENOUS at 15:25

## 2023-08-07 RX ADMIN — Medication 40 MG: at 14:45

## 2023-08-07 RX ADMIN — MIDAZOLAM 1 MG: 1 INJECTION INTRAMUSCULAR; INTRAVENOUS at 14:42

## 2023-08-07 RX ADMIN — HYDROMORPHONE HYDROCHLORIDE 0.4 MG: 0.2 INJECTION, SOLUTION INTRAMUSCULAR; INTRAVENOUS; SUBCUTANEOUS at 17:01

## 2023-08-07 RX ADMIN — OXYCODONE HYDROCHLORIDE 5 MG: 5 TABLET ORAL at 23:30

## 2023-08-07 RX ADMIN — PROCHLORPERAZINE EDISYLATE 5 MG: 5 INJECTION, SOLUTION INTRAMUSCULAR; INTRAVENOUS at 18:47

## 2023-08-07 RX ADMIN — SCOPALAMINE 1 PATCH: 1 PATCH, EXTENDED RELEASE TRANSDERMAL at 14:30

## 2023-08-07 RX ADMIN — AMLODIPINE BESYLATE 2.5 MG: 2.5 TABLET ORAL at 21:44

## 2023-08-07 RX ADMIN — HYDROMORPHONE HYDROCHLORIDE 0.4 MG: 0.2 INJECTION, SOLUTION INTRAMUSCULAR; INTRAVENOUS; SUBCUTANEOUS at 17:32

## 2023-08-07 RX ADMIN — ONDANSETRON 4 MG: 2 INJECTION INTRAMUSCULAR; INTRAVENOUS at 16:58

## 2023-08-07 ASSESSMENT — ACTIVITIES OF DAILY LIVING (ADL)
ADLS_ACUITY_SCORE: 35
DRESSING/BATHING_DIFFICULTY: NO
ADLS_ACUITY_SCORE: 35
CHANGE_IN_FUNCTIONAL_STATUS_SINCE_ONSET_OF_CURRENT_ILLNESS/INJURY: YES
VISION_MANAGEMENT: GLASSES
ADLS_ACUITY_SCORE: 35
ADLS_ACUITY_SCORE: 35
ADLS_ACUITY_SCORE: 20
ADLS_ACUITY_SCORE: 35
FALL_HISTORY_WITHIN_LAST_SIX_MONTHS: NO
WALKING_OR_CLIMBING_STAIRS_DIFFICULTY: NO
DOING_ERRANDS_INDEPENDENTLY_DIFFICULTY: NO
TOILETING_ISSUES: NO
WEAR_GLASSES_OR_BLIND: YES
DIFFICULTY_COMMUNICATING: NO
HEARING_DIFFICULTY_OR_DEAF: NO
DIFFICULTY_EATING/SWALLOWING: NO
CONCENTRATING,_REMEMBERING_OR_MAKING_DECISIONS_DIFFICULTY: NO

## 2023-08-07 NOTE — OR NURSING
Pt complaining of worsening left abd pain despite pain medication, GI MD, Darron, at bedside, updated. CT scan ordered. Per CT, additional labs needed prior to scan, MD contacted for orders. MD Anesthesia, Senatobia, also updated.

## 2023-08-07 NOTE — ADDENDUM NOTE
Addendum  created 08/07/23 1849 by Hussain Jimenez MD    Clinical Note Signed, Pend clinical note

## 2023-08-07 NOTE — ANESTHESIA PROCEDURE NOTES
Airway         Procedure Start/Stop Times: 8/7/2023 2:47 PM  Staff -        Anesthesiologist:  Joselyn Gore MD       Performed By: anesthesiologist  Consent for Airway        Urgency: elective  Indications and Patient Condition       Indications for airway management: batsheva-procedural       Induction type:intravenous       Mask difficulty assessment: 2 - vent by mask + OA or adjuvant +/- NMBA    Final Airway Details       Final airway type: endotracheal airway       Successful airway: ETT - single  Endotracheal Airway Details        ETT size (mm): 7.5       Cuffed: yes       Successful intubation technique: direct laryngoscopy       DL Blade Type: Gomez 3       Grade View of Cords: 2       Adjucts: stylet       Position: Right       Measured from: gums/teeth       Secured at (cm): 22       Bite block used: None    Post intubation assessment        Placement verified by: capnometry, equal breath sounds and chest rise        Number of attempts at approach: 1       Number of other approaches attempted: 0       Secured with: ties       Ease of procedure: easy       Dentition: Intact    Medication(s) Administered   Medication Administration Time: 8/7/2023 2:47 PM

## 2023-08-07 NOTE — OR NURSING
Took over for Yumiko RN in PACU, CT scan with contrast ordered, CT would prefer PIV to be larger gauge and higher in arm for contrast administration, placed 18G in R forearm, renal panel drawn for creatinine check.     Pt feeling faint at this time, HOB lowered, IV fluids open wide in 18G in R forearm. Removed warm blankets, BP checking every 5 minutes, no loss in consciousness or change in BP, pt feeling slightly better in regards to lightheadedness but still having severe abd pain, anesthesia MD at bedside and will order other pain medication to be given while CT completed and waiting for results.     Continuing to closely monitor in PACU and will administer pain medication as ordered.     Addendum: CT completed, GI MD updated family and patient over the phone, patient to be admitted overnight for observation related to acute pain and nausea, holding in PACU until bed available upstairs.

## 2023-08-07 NOTE — BRIEF OP NOTE
[facility]    Brief Operative Note    Pre-operative diagnosis: Pancreatic pseudocyst [K86.3]  Post-operative diagnosis Same as pre-operative diagnosis    Procedure: Procedure(s):  ENDOSCOPIC ULTRASOUND, CYST GASTROSTOMY WITH STENT PLACEMENT  Surgeon: Surgeon(s) and Role:     * Guru Luz Victoria MD - Primary     * La Rob MD  Anesthesia: General   Estimated Blood Loss: Minimal    Drains: None  Specimens: * No specimens in log *  Findings:   None.  Complications: None.  Implants:   Implant Name Type Inv. Item Serial No.  Lot No. LRB No. Used Action   STENT BILIARY ZIMMON DOUBLE PIGTAIL [id number] - [id number] Stent STENT BILIARY ZIMMON DOUBLE PIGTAIL [id number]  [employer] [id number] N/A 1 Implanted   STENT BILIARY ZIMMON DOUBLE PIGTAIL [id number] - [id number] Stent STENT BILIARY ZIMMON DOUBLE PIGTAIL [id number]  [employer] [id number] N/A 1 Wasted       Findings: 63mm by 45mm anechoic pseudocyst located in the body of the pancreas.  A cook 10Fr cystotome was used to access the pseudocyst cavity and place a 0.018 Roadrunner wire in the pseudocyst cavity under fluoroscopic guidance and   The tract was dilated with a 6mm Hurricane balloon.  An additional 0.025 Visiglide wire was placed into the cyst cavity under fluoroscopic guidance. A 7Fr by 2cm Zimmon double pigtail stent was placed over the Visiglide wire into the cyst cavity.  A second 7Fr Zimmon stent placement was attempted, but the Roadrunner wire was dislodged and further attempts were aborted given high risk.      Recommendations:  - same day observation   - hold anticoagulation for 3 days  - repeat CTAP in 2 months to reassess pseudocyst in the setting of disrupted duct  - findings communicated to patient and family

## 2023-08-08 PROBLEM — K91.89 POST-ERCP ACUTE PANCREATITIS: Status: ACTIVE | Noted: 2023-08-08

## 2023-08-08 PROBLEM — K85.90 POST-ERCP ACUTE PANCREATITIS: Status: ACTIVE | Noted: 2023-08-08

## 2023-08-08 LAB
ANION GAP SERPL CALCULATED.3IONS-SCNC: 9 MMOL/L (ref 7–15)
ATRIAL RATE - MUSE: 43 BPM
BUN SERPL-MCNC: 15.3 MG/DL (ref 8–23)
CALCIUM SERPL-MCNC: 8.6 MG/DL (ref 8.8–10.2)
CHLORIDE SERPL-SCNC: 106 MMOL/L (ref 98–107)
CREAT SERPL-MCNC: 1.02 MG/DL (ref 0.67–1.17)
DEPRECATED HCO3 PLAS-SCNC: 24 MMOL/L (ref 22–29)
DIASTOLIC BLOOD PRESSURE - MUSE: NORMAL MMHG
ERYTHROCYTE [DISTWIDTH] IN BLOOD BY AUTOMATED COUNT: 12.8 % (ref 10–15)
GFR SERPL CREATININE-BSD FRML MDRD: 84 ML/MIN/1.73M2
GLUCOSE BLDC GLUCOMTR-MCNC: 150 MG/DL (ref 70–99)
GLUCOSE BLDC GLUCOMTR-MCNC: 188 MG/DL (ref 70–99)
GLUCOSE SERPL-MCNC: 148 MG/DL (ref 70–99)
HCT VFR BLD AUTO: 42.1 % (ref 40–53)
HGB BLD-MCNC: 14.1 G/DL (ref 13.3–17.7)
INTERPRETATION ECG - MUSE: NORMAL
MCH RBC QN AUTO: 29.8 PG (ref 26.5–33)
MCHC RBC AUTO-ENTMCNC: 33.5 G/DL (ref 31.5–36.5)
MCV RBC AUTO: 89 FL (ref 78–100)
P AXIS - MUSE: 71 DEGREES
PLATELET # BLD AUTO: 191 10E3/UL (ref 150–450)
POTASSIUM SERPL-SCNC: 4.4 MMOL/L (ref 3.4–5.3)
PR INTERVAL - MUSE: 148 MS
QRS DURATION - MUSE: 90 MS
QT - MUSE: 492 MS
QTC - MUSE: 415 MS
R AXIS - MUSE: 49 DEGREES
RBC # BLD AUTO: 4.73 10E6/UL (ref 4.4–5.9)
SODIUM SERPL-SCNC: 139 MMOL/L (ref 136–145)
SYSTOLIC BLOOD PRESSURE - MUSE: NORMAL MMHG
T AXIS - MUSE: 53 DEGREES
VENTRICULAR RATE- MUSE: 43 BPM
WBC # BLD AUTO: 10.8 10E3/UL (ref 4–11)

## 2023-08-08 PROCEDURE — 85027 COMPLETE CBC AUTOMATED: CPT | Performed by: STUDENT IN AN ORGANIZED HEALTH CARE EDUCATION/TRAINING PROGRAM

## 2023-08-08 PROCEDURE — 120N000002 HC R&B MED SURG/OB UMMC

## 2023-08-08 PROCEDURE — 36415 COLL VENOUS BLD VENIPUNCTURE: CPT | Performed by: STUDENT IN AN ORGANIZED HEALTH CARE EDUCATION/TRAINING PROGRAM

## 2023-08-08 PROCEDURE — 80048 BASIC METABOLIC PNL TOTAL CA: CPT | Performed by: STUDENT IN AN ORGANIZED HEALTH CARE EDUCATION/TRAINING PROGRAM

## 2023-08-08 PROCEDURE — 99232 SBSQ HOSP IP/OBS MODERATE 35: CPT | Performed by: INTERNAL MEDICINE

## 2023-08-08 PROCEDURE — 93005 ELECTROCARDIOGRAM TRACING: CPT

## 2023-08-08 PROCEDURE — 82962 GLUCOSE BLOOD TEST: CPT

## 2023-08-08 PROCEDURE — 93010 ELECTROCARDIOGRAM REPORT: CPT | Performed by: INTERNAL MEDICINE

## 2023-08-08 PROCEDURE — 250N000013 HC RX MED GY IP 250 OP 250 PS 637: Performed by: STUDENT IN AN ORGANIZED HEALTH CARE EDUCATION/TRAINING PROGRAM

## 2023-08-08 RX ORDER — OXYCODONE HYDROCHLORIDE 5 MG/1
5 TABLET ORAL EVERY 6 HOURS PRN
Qty: 20 TABLET | Refills: 0 | Status: SHIPPED | OUTPATIENT
Start: 2023-08-08 | End: 2023-08-11

## 2023-08-08 RX ADMIN — GABAPENTIN 1200 MG: 400 CAPSULE ORAL at 08:35

## 2023-08-08 RX ADMIN — Medication 1 TABLET: at 08:35

## 2023-08-08 RX ADMIN — Medication 400 MCG: at 08:35

## 2023-08-08 RX ADMIN — OXYCODONE HYDROCHLORIDE 5 MG: 5 TABLET ORAL at 10:55

## 2023-08-08 RX ADMIN — DILTIAZEM HYDROCHLORIDE 120 MG: 120 CAPSULE, EXTENDED RELEASE ORAL at 08:39

## 2023-08-08 RX ADMIN — PANCRELIPASE 1 CAPSULE: 30000; 6000; 19000 CAPSULE, DELAYED RELEASE PELLETS ORAL at 08:35

## 2023-08-08 RX ADMIN — GABAPENTIN 1200 MG: 400 CAPSULE ORAL at 20:22

## 2023-08-08 RX ADMIN — OXYCODONE HYDROCHLORIDE 10 MG: 5 TABLET ORAL at 21:44

## 2023-08-08 RX ADMIN — AMLODIPINE BESYLATE 2.5 MG: 2.5 TABLET ORAL at 21:44

## 2023-08-08 RX ADMIN — PANCRELIPASE 1 CAPSULE: 30000; 6000; 19000 CAPSULE, DELAYED RELEASE PELLETS ORAL at 13:15

## 2023-08-08 ASSESSMENT — ACTIVITIES OF DAILY LIVING (ADL)
ADLS_ACUITY_SCORE: 20

## 2023-08-08 NOTE — PLAN OF CARE
Vitals:    08/07/23 2315 08/08/23 0422 08/08/23 0500 08/08/23 0815   BP: 120/67 129/71  122/70   BP Location: Right arm   Right arm   Patient Position: Semi-Myers's      Cuff Size: Adult Regular      Pulse: 92 (!) 44 58 56   Resp: 14 16  16   Temp: 97.5  F (36.4  C) 97.5  F (36.4  C)  98.4  F (36.9  C)   TempSrc: Oral Oral  Oral   SpO2: 95% 97%  97%   Weight:       Height:          Afebrile vitally stable, bradycardic, sating 97% on room air, non labor breathing,   Denies nausea, started regular diet, tolerating good,   Up ad hollie ambulated independently, voiding adequate, PIV SL   Oxy 5 mg x1, well managed pain.  - 150, family at bed side, possible discharge tomorrow, continue with plan of care.

## 2023-08-08 NOTE — PLAN OF CARE
Goal Outcome Evaluation:      Plan of Care Reviewed With: patient    Overall Patient Progress: improving     Problem: Pain Acute  Goal: Optimal Pain Control and Function  Outcome: Progressing     Problem: Nausea and Vomiting  Goal: Nausea and Vomiting Relief  Outcome: Progressing     Heart rate 40's. Pain of 5 given 5 mg oxycodone once. No nausea. A&Ox4. Up SBA. Clear diet.

## 2023-08-08 NOTE — PHARMACY-ADMISSION MEDICATION HISTORY
Pharmacist Admission Medication History    Admission medication history is complete. The information provided in this note is only as accurate as the sources available at the time of the update.    Medication reconciliation/reorder completed by provider prior to medication history? Yes    Information Source(s): Patient and CareEverywhere/SureScripts via in-person    Pertinent Information:   Patient has been holding PTA warfarin in anticipation of procedure since 8/2. Most recent dosing per anticoagulation clinic was 7.5 mg on Monday, Thursday and Saturday and 5 mg daily all other days.   Gabapentin prescribed 900 mg TID - patient takes 1200 mg BID since he is not often home during the day to take the afternoon dose.   Usually only takes creon once daily with evening meal because that is generally his only meal of the day    Changes made to PTA medication list:  Added: None  Deleted: None  Changed: None      Medication History Completed By: Patricia Delcid RPH 8/8/2023 4:23 PM  Prior to Admission medications    Medication Sig Last Dose Taking? Auth Provider Long Term End Date   amLODIPine (NORVASC) 2.5 MG tablet Take 1 tablet (2.5 mg) by mouth 1 time per day 8/6/2023 at HS Yes Reported, Patient Yes    CREON 6000-22075 units CPEP per EC capsule Take 1 capsule by mouth 3 times a day with meals 8/6/2023 at PM Yes Reported, Patient     diltiazem ER COATED BEADS (CARDIZEM CD/CARTIA XT) 120 MG 24 hr capsule Take 120 mg by mouth daily 8/6/2023 at AM Yes Reported, Patient Yes    folic acid (FOLVITE) 1 MG tablet Take 1 tablet (1 mg) by mouth 1 time per day 8/6/2023 at AM Yes Reported, Patient     gabapentin (NEURONTIN) 600 MG tablet Take 1,200 mg by mouth 2 times daily 8/7/2023 at AM Yes Unknown, Entered By History No    magnesium oxide (MAG-OX) 400 MG tablet Take 400 mg by mouth daily 8/6/2023 at AM Yes Reported, Patient     metFORMIN (GLUCOPHAGE XR) 500 MG 24 hr tablet Take 500 mg by mouth 8/5/2023 Yes Reported, Patient Yes     multivitamin w/minerals (MULTI-VITAMIN) tablet Take 1 tablet by mouth daily 8/6/2023 at AM Yes Reported, Patient     Warfarin (JANTOVEN) 5 mg tablet Take 7.5 mg by mouth once daily on Monday, Thursday and Saturday and 5 mg daily all other days        oxyCODONE (ROXICODONE) 5 MG tablet Take 1 tablet (5 mg) by mouth every 6 hours as needed for pain  Yes Karri Yost MD  8/11/23   blood glucose (ONETOUCH VERIO IQ) test strip 1 strip   Reported, Patient

## 2023-08-08 NOTE — PROVIDER NOTIFICATION
7B 231 Susan, K. Pt's HR is 40 to 46. /71 RR 16 T 97.5 O2 97% 1 LPM. Patient states no chest pain, dizziness or other new cardiac symptoms. Jennifer [phone]    Paged [employer] cover again with no response either time.   Response around 0540, ECG ordered.

## 2023-08-08 NOTE — CONSULTS
"Patient states that his pain is still at a 5/10 from baseline 3/10 and has spread over this entire upper abdomen since attempting bone broth this afternoon.  Denies nausea.  Pain overall slightly improved since post-operative increase yesterday.    Vital signs:  Temp: 98.4  F (36.9  C) Temp src: Oral BP: 122/70 Pulse: 56   Resp: 16 SpO2: 97 % O2 Device: None (Room air) Oxygen Delivery: 2 LPM Height: 172.7 cm (5' 8\") Weight: 85.8 kg (189 lb 2.5 oz)  Estimated body mass index is 28.76 kg/m  as calculated from the following:    Height as of this encounter: 1.727 m (5' 8\").    Weight as of this encounter: 85.8 kg (189 lb 2.5 oz).    Vitals interpretation: no tachycardia, no hypertension     Gen: resting comfortably in bed with legs crossed - goes from supine to sitting without expression of pain, NAD - talking at length without difficulty  HEENT: PERRL, EOMI  Lung: CTAB  Heart: irregularly irregular  Abdomen: soft, epigastric> bilateral upper quadrants TTP, no rebound, no guarding, no peritoneal signs/pain when moving the bed while talking to patient    Assessment: Mr. Davis is a pleasant 63M with a significant PMH of reactive hepatitis, necrotizing pancreatitis, pancreatic pseudocyst, s/p cyst gastrostomy, hypertension, and A-fib on warfarin for anticoagulation was seen for follow-up. He was hospitalized recently for postprocedure acute pancreatitis in the setting of cyst gastrostomy for management of pancreatic pseudocyst. He underwent repeat cyst gastrostomy 8/7/23 where he reported and increase in his abdominal pain andnausea post-operatively.  CTAP confirmed gastrostomy in the correct position - no evidence of post-procedural perforation or bleeding.     Recommendations:  - advance diet as tolerated  - hold warfarin for 3 days post procedure  - Will recommend CT abdomen/pelvis with IV contrast to re-evaluate for residual pseudocyst in 2 months followed by pancreas clinic follow up.    - Will recommend antibiotics " for 1 week

## 2023-08-08 NOTE — PROGRESS NOTES
Called about patient with HR in the 40s. In the AM shift, was in the 80s. Asked RN to have patient wake up and ambulate. This resulted in HR increasing to 60s demonstrating chronotropic competence. EKG shows sinus daly.   This is likely 2/2 AV gerardo blocking effects of his diltiazem

## 2023-08-08 NOTE — PROGRESS NOTES
[facility]    Medicine Progress Note - Hospitalist Service, GOLD TEAM 7    Date of Admission:  8/7/2023    Assessment & Plan   Luis Davsi is a 63 year old male with a PMH of afib on warfarin, DM2, chronic pancreatitis and pancreatic pseudocyst, neuropathy, HTN, obesity, restless leg syndrome. He was admitted on 8/7/2023 for post op pain control after GI intervention with endoscopy and stent placement into pseudocyst.      Acute on chronic abdominal pain  Chronic pancreatitis with pancreatic pseudocyst s/p cyst gastrostomy x2  First developed necrotizing pancreatitis w/ subsequent drain placement, later fluid collection recurred and had cyst gastrostomy 3/2023 and removed 4/2023. Having continued nausea and pain. Brought in by GI for scope w/ stent placement into pancreatic cyst, having severe post-op pain requiring IV dilaudid.   -Pain control: tylenol, oxy PO, dilaudid IV for breakthrough  -Zofran and compazine for nausea  -Clear liquid diet for now, increase as tolerated  -IVF overnight, will trial off fluids to see if he can manitain nutrition  -Cont PTA creon      DM2 with neuropathy  Known issue for pt. Sugar on BMP this evening stable at 134. Will add TID BG checks and PRN for now w/ hypoglycemia protocol while on clear liquid diet / not tolerating orals. Med list shows gabapentin 900mg TID but pt states he takes 1200mg gabapentin BID for his neuropathy and wants this continued.  -Hold PTA metformin  -Cont PTA gabapentin     Afib on warfarin  Pt rate controlled this admission, warfarin on hold for procedure.   -Cont PTA dilt  -Continue holding warfarin, restart per GI clearance post-op; pt is established with anticoagulation clinic and can follow up outpatient regarding this     HTN: cont PTA amlodipine and diltiazem        Diet: Diet  Regular Diet Adult    DVT Prophylaxis: Pneumatic Compression Devices  Richard Catheter: Not present  Lines: None     Cardiac  "Monitoring: None  Code Status: Full Code      Clinically Significant Risk Factors Present on Admission                 # Drug Induced Coagulation Defect: home medication list includes an anticoagulant medication         # Overweight: Estimated body mass index is 28.76 kg/m  as calculated from the following:    Height as of this encounter: 1.727 m (5' 8\").    Weight as of this encounter: 85.8 kg (189 lb 2.5 oz).            Disposition Plan      Expected Discharge Date: 08/09/2023                  Karri Yost MD  Hospitalist Service, [facility] TEAM 7  [facility]  Securely message with [employer] (more info)  Text page via [facility] Paging/Directory   See signed in provider for up to date coverage information  ______________________________________________________________________    Interval History   Tolerated clears, some abdominal pain with regular diet    Physical Exam   Vital Signs: Temp: 98.4  F (36.9  C) Temp src: Oral BP: 122/70 Pulse: 56   Resp: 16 SpO2: 97 % O2 Device: None (Room air) Oxygen Delivery: 2 LPM  Weight: 189 lbs 2.47 oz    Gen: NAD  HEENT: EOMI, PERRL, MMM  CV: extremities warm and well perfused  Resp: breathing comfortably on RA  : deferred  Abd: tender to palpation in mid epiastric area  Msk: no LE edema  Skin: no rashes  Neuro: nonfocal exam      Medical Decision Making             Data     "

## 2023-08-08 NOTE — H&P
[facility]    History and Physical - Hospitalist Service, GOLD TEAM        Date of Admission:  8/7/2023    Assessment & Plan      Luis Davis is a 63 year old male with a PMH of afib on warfarin, DM2, chronic pancreatitis and pancreatic pseudocyst, neuropathy, HTN, obesity, restless leg syndrome. He was admitted on 8/7/2023 for post op pain control after GI intervention with endoscopy and stent placement into pseudocyst.     Acute on chronic abdominal pain  Chronic pancreatitis with pancreatic pseudocyst s/p cyst gastrostomy x2  First developed necrotizing pancreatitis w/ subsequent drain placement, later fluid collection recurred and had cyst gastrostomy 3/2023 and removed 4/2023. Having continued nausea and pain. Brought in by GI for scope w/ stent placement into pancreatic cyst, having severe post-op pain requiring IV dilaudid.   -Follow up CT abdomen  -GI to see pt in AM  -Pain control: tylenol, oxy PO, dilaudid IV for breakthrough  -Zofran and compazine for nausea  -Clear liquid diet for now  -IVF overnight  -Cont PTA creon     DM2 with neuropathy  Known issue for pt. Sugar on BMP this evening stable at 134. Will add TID BG checks and PRN for now w/ hypoglycemia protocol while on clear liquid diet / not tolerating orals. Med list shows gabapentin 900mg TID but pt states he takes 1200mg gabapentin BID for his neuropathy and wants this continued.  -Hold PTA metformin  -Cont PTA gabapentin    Afib on warfarin  Pt rate controlled this admission, warfarin on hold for procedure.   -Cont PTA dilt  -Continue holding warfarin, restart per GI clearance post-op; pt is established with anticoagulation clinic and can follow up outpatient regarding this    HTN: cont PTA amlodipine and diltiazem       Diet: Clear Liquid Diet    DVT Prophylaxis: Pneumatic Compression Devices  Richard Catheter: Not present  Lines: None     Cardiac Monitoring: None  Code Status: Full Code   "    Clinically Significant Risk Factors Present on Admission               # Drug Induced Coagulation Defect: home medication list includes an anticoagulant medication         # Overweight: Estimated body mass index is 28.76 kg/m  as calculated from the following:    Height as of this encounter: 1.727 m (5' 8\").    Weight as of this encounter: 85.8 kg (189 lb 2.5 oz).            Disposition Plan      Expected Discharge Date: 08/07/2023                  Beka Moore DO  Hospitalist Service, [facility]  Securely message with [employer] (more info)  Text page via [facility] Paging/Directory   See signed in provider for up to date coverage information    ______________________________________________________________________    Chief Complaint   Pain    History is obtained from the patient    History of Present Illness   Luis Davis is a 63 year old male who presents with post-op abdominal pain and nausea. Had GI scope as above, in PACU had severe abdominal pain requiring IV dilaudid multiple doses. Improved with this. Admitted for post-op pain control and GI to follow. Pt reports improved pain with IV dilaudid, willing to go to orals as tolerated and try a clear diet. Reports no issues swallowing at this time. No hemoptysis, fever, chills, or other concerning sx.       Past Medical History    Past Medical History:   Diagnosis Date    Acute pancreatitis     Alcohol abuse     Antiplatelet or antithrombotic long-term use     Cholangitis     Diabetes (H)     Hypertension     Neuropathy     Obese     Pancreatic pseudocyst     Paroxysmal atrial fibrillation (H)        Past Surgical History   Past Surgical History:   Procedure Laterality Date    GASTROSTOMY         Prior to Admission Medications   Prior to Admission Medications   Prescriptions Last Dose Informant Patient Reported? Taking?   CREON 6000-68377 units CPEP per EC capsule Unknown  Yes Yes   Sig: Take 1 capsule by " mouth 3 times a day with meals   amLODIPine (NORVASC) 2.5 MG tablet 2023  Yes Yes   Sig: Take 1 tablet (2.5 mg) by mouth 1 time per day   blood glucose (ONETOUCH VERIO IQ) test strip   Yes No   Si strip   diltiazem ER COATED BEADS (CARDIZEM CD/CARTIA XT) 120 MG 24 hr capsule 2023  Yes Yes   Sig: Take 120 mg by mouth daily   folic acid (FOLVITE) 1 MG tablet 2023  Yes Yes   Sig: Take 1 tablet (1 mg) by mouth 1 time per day   gabapentin (NEURONTIN) 600 MG tablet 2023  Yes Yes   Sig: TAKE 1 AND 1/2 TABLETS BY MOUTH 3 TIMES DAILY   magnesium oxide (MAG-OX) 400 MG tablet 2023  Yes Yes   Sig: Take 400 mg by mouth daily   metFORMIN (GLUCOPHAGE XR) 500 MG 24 hr tablet 2023  Yes Yes   Sig: Take 500 mg by mouth   multivitamin w/minerals (MULTI-VITAMIN) tablet 2023  Yes Yes   Sig: Take 1 tablet by mouth daily   warfarin ANTICOAGULANT (COUMADIN) 5 MG tablet 2023  Yes No   Sig: Take as directed by Anticoagulation Management Service      Facility-Administered Medications: None           Physical Exam   Vital Signs: Temp: 97.8  F (36.6  C) Temp src: Oral BP: 119/75 Pulse: 109   Resp: 14 SpO2: 95 % O2 Device: None (Room air) Oxygen Delivery: 2 LPM  Weight: 189 lbs 2.47 oz    Gen: No acute distress, non-toxic, alert  CV: Regular rate, regular rhythm  Pulm: Clear to auscultate bilaterally, breathing non-labored  Abd: Epigastric and LUQ mild tenderness, non-distended  Ext: Moves all extremities, non-cyanotic  Psych: Normal mood and affect    Medical Decision Making           Data     I have personally reviewed the following data over the past 24 hrs:    N/A  \   N/A   / N/A     139 106 16.2 /  134 (H)   4.2 22 1.11 \     ALT: N/A AST: N/A AP: N/A TBILI: N/A   ALB: 4.2 TOT PROTEIN: N/A LIPASE: N/A       Imaging results reviewed over the past 24 hrs:   Recent Results (from the past 24 hour(s))   XR Surgery ANDREA G/T 5 Min Fluoro w Stills    Narrative    This exam was marked as non-reportable because it  will not be read by a   radiologist or a [place] non-radiologist provider.

## 2023-08-09 VITALS
RESPIRATION RATE: 16 BRPM | HEIGHT: 68 IN | OXYGEN SATURATION: 97 % | TEMPERATURE: 98.2 F | HEART RATE: 53 BPM | DIASTOLIC BLOOD PRESSURE: 69 MMHG | SYSTOLIC BLOOD PRESSURE: 115 MMHG | WEIGHT: 189.15 LBS | BODY MASS INDEX: 28.67 KG/M2

## 2023-08-09 LAB
HOLD SPECIMEN: NORMAL
LIPASE SERPL-CCNC: 62 U/L (ref 13–60)

## 2023-08-09 PROCEDURE — 83690 ASSAY OF LIPASE: CPT | Performed by: INTERNAL MEDICINE

## 2023-08-09 PROCEDURE — 36415 COLL VENOUS BLD VENIPUNCTURE: CPT | Performed by: INTERNAL MEDICINE

## 2023-08-09 PROCEDURE — 99239 HOSP IP/OBS DSCHRG MGMT >30: CPT | Performed by: INTERNAL MEDICINE

## 2023-08-09 PROCEDURE — 250N000013 HC RX MED GY IP 250 OP 250 PS 637: Performed by: STUDENT IN AN ORGANIZED HEALTH CARE EDUCATION/TRAINING PROGRAM

## 2023-08-09 RX ORDER — CEFDINIR 300 MG/1
300 CAPSULE ORAL 2 TIMES DAILY
Qty: 10 CAPSULE | Refills: 0 | Status: SHIPPED | OUTPATIENT
Start: 2023-08-09

## 2023-08-09 RX ORDER — CIPROFLOXACIN 500 MG/1
500 TABLET, FILM COATED ORAL EVERY 12 HOURS
Qty: 10 TABLET | Refills: 0 | Status: SHIPPED | OUTPATIENT
Start: 2023-08-09 | End: 2023-08-09

## 2023-08-09 RX ORDER — CIPROFLOXACIN 500 MG/1
500 TABLET, FILM COATED ORAL 2 TIMES DAILY
Qty: 10 TABLET | Refills: 0 | Status: SHIPPED | OUTPATIENT
Start: 2023-08-09 | End: 2023-08-09

## 2023-08-09 RX ORDER — CIPROFLOXACIN 500 MG/1
500 TABLET, FILM COATED ORAL EVERY 12 HOURS SCHEDULED
Status: DISCONTINUED | OUTPATIENT
Start: 2023-08-09 | End: 2023-08-09

## 2023-08-09 RX ORDER — CEFDINIR 300 MG/1
300 CAPSULE ORAL 2 TIMES DAILY
Qty: 10 CAPSULE | Refills: 0 | Status: SHIPPED | OUTPATIENT
Start: 2023-08-09 | End: 2023-08-09

## 2023-08-09 RX ORDER — METRONIDAZOLE 500 MG/1
500 TABLET ORAL 2 TIMES DAILY
Qty: 10 TABLET | Refills: 0 | Status: SHIPPED | OUTPATIENT
Start: 2023-08-09 | End: 2023-08-09

## 2023-08-09 RX ORDER — WARFARIN SODIUM 5 MG/1
5 TABLET ORAL
COMMUNITY
Start: 2023-08-10

## 2023-08-09 RX ORDER — WARFARIN SODIUM 5 MG/1
5 TABLET ORAL
Status: DISCONTINUED | OUTPATIENT
Start: 2023-08-10 | End: 2023-08-09 | Stop reason: HOSPADM

## 2023-08-09 RX ADMIN — Medication 400 MCG: at 08:29

## 2023-08-09 RX ADMIN — GABAPENTIN 1200 MG: 400 CAPSULE ORAL at 08:29

## 2023-08-09 RX ADMIN — PANCRELIPASE 1 CAPSULE: 30000; 6000; 19000 CAPSULE, DELAYED RELEASE PELLETS ORAL at 08:29

## 2023-08-09 RX ADMIN — DILTIAZEM HYDROCHLORIDE 120 MG: 120 CAPSULE, EXTENDED RELEASE ORAL at 08:29

## 2023-08-09 RX ADMIN — Medication 1 TABLET: at 08:29

## 2023-08-09 ASSESSMENT — ACTIVITIES OF DAILY LIVING (ADL)
ADLS_ACUITY_SCORE: 20

## 2023-08-09 NOTE — CARE PLAN
"Vitals: Blood pressure 102/45, pulse 71, temperature 98.2  F (36.8  C), temperature source Oral, resp. rate 16, height 1.727 m (5' 8\"), weight 85.8 kg (189 lb 2.5 oz), SpO2 94 %.  Time: 1900-0730  Neuro: A/O x 4, calls appropriately and makes needs known.  Activity: up ad hollie   Pain and N/V: abdominal pain managed affectively with oxycodone.  GI/: No BM this shift, Voiding spontaneously. AUOP.   Cardiac: Denies cardiac chest pain.   Diet: Regular.   Respiratory: Denies SOB, on RA  Lines: L PIV SL.   Incisions/Drains/Skin: WDL, No new deficit.   Lab: Reviewed.  New changes this shift: No significant changes this shift, Continue POC.    "

## 2023-08-09 NOTE — DISCHARGE SUMMARY
"[facility]  Hospitalist Discharge Summary      Date of Admission:  8/7/2023  Date of Discharge:  8/9/2023  Discharging Provider: Karri Yost MD  Discharge Service: Hospitalist Service, GOLD TEAM 7    Discharge Diagnoses   Acute on chronic abdominal pain  Chronic pancreatitis with pancreatic pseudocyst s/p cyst gastrostomy x2  DM2 with neuropathy   Afib on warfarin     Clinically Significant Risk Factors     # Overweight: Estimated body mass index is 28.76 kg/m  as calculated from the following:    Height as of this encounter: 1.727 m (5' 8\").    Weight as of this encounter: 85.8 kg (189 lb 2.5 oz).       Follow-ups Needed After Discharge   Follow-up Appointments     Adult [facility] Follow-up and recommended labs and tests      Follow up with primary care provider as scheduled (8/21).  The GI team will order a repeat CT of your abdomen in 2 months    Appointments on [place] and/or [facility] (with [facility] or [facility]   provider or service). Call [phone] if you haven't heard regarding   these appointments within 7 days of discharge.                  Discharge Disposition   Discharged to home  Condition at discharge: Stable    Hospital Course   Luis Davis is a 63 year old male with a PMH of afib on warfarin, DM2, chronic pancreatitis and pancreatic pseudocyst, neuropathy, HTN, obesity, restless leg syndrome. He was admitted on 8/7/2023 for post op pain control after GI intervention with endoscopy and stent placement into pseudocyst. Procedure well tolerated, and by discharge, was tolerating po on oral pain medications     Acute on chronic abdominal pain  Chronic pancreatitis with pancreatic pseudocyst s/p cyst gastrostomy x2  First developed necrotizing pancreatitis w/ subsequent drain placement, later fluid collection recurred and had cyst gastrostomy 3/2023 and removed 4/2023. Having continued nausea and pain. Brought in by GI for scope w/ stent placement into " pancreatic cyst, having severe post-op pain requiring IV dilaudid.   -Pain controlled with tylenol, oxy PO, dilaudid IV for breakthrough. By discharge, was tolerating oral intake with po meds only  -Zofran and compazine for nausea   -Cont PTA creon   - discharged on oral antibiotic per GI for prophylaxis     DM2 with neuropathy  stable  -Held PTA metformin during admission, resume on discharge  -Cont PTA gabapentin     Afib on warfarin  Pt rate controlled this admission, warfarin instructions provided on discharge per pharmacy.  He will follow up with his warfarin clinic  -Cont PTA dilt        HTN: cont PTA amlodipine and diltiazem     - Will recommend CT abdomen/pelvis with IV contrast to re-evaluate for residual pseudocyst in 2 months followed by pancreas clinic follow up.    - Will recommend antibiotics for 1 week     Consultations This Hospital Stay   GI PANCREATICOBILIARY ADULT IP CONSULT    Code Status   Prior    Time Spent on this Encounter   I, Karri Yost MD, personally saw the patient today and spent greater than 30 minutes discharging this patient.       Karri Yost MD  [facility] [address] [place] [id number]  Phone: [phone]  ______________________________________________________________________    Physical Exam   Vital Signs:                    Weight: 189 lbs 2.47 oz  Gen: NAD  HEENT: EOMI, PERRL, MMM  CV: extremities warm and well perfused  Resp: breathing comfortably on RA  : deferred  Msk: no LE edema  Skin: no rashes  Neuro: nonfocal exam         Primary Care Physician   Elly Gandih    Discharge Orders      Activity    Your activity upon discharge: activity as tolerated     Adult [facility] Follow-up and recommended labs and tests    Follow up with primary care provider as scheduled (8/21).  The GI team will order a repeat CT of your abdomen in 2 months    Appointments on [place] and/or [facility] (with [facility] or [facility] provider or service).  Call [phone] if you haven't heard regarding these appointments within 7 days of discharge.     Reason for your hospital stay    Monitoring after GI procedure    Please restart your warfarin on 8/10, and follow up with your warfarin clinic for monitoring.     We also are sending 1 antibiotic:  Cefdinir: Please take it twice a day for 5 days, to prevent infection after the procedure     Diet    Follow this diet upon discharge: Orders Placed This Encounter      regular       Significant Results and Procedures   Results for orders placed or performed during the hospital encounter of 08/07/23   XR Surgery ANDREA G/T 5 Min Fluoro w Stills    Narrative    This exam was marked as non-reportable because it will not be read by a   radiologist or a [place] non-radiologist provider.         CT Abdomen Pelvis w Contrast    Narrative    EXAMINATION: CT ABDOMEN PELVIS W CONTRAST, 8/7/2023 7:32 PM    INDICATION: 63M s/p cyst gastrostomy of a pancreatic body pseudocyst  with post-procedural LUQ pain - please evaluate for correct stent  position, thank you.    COMPARISON STUDY: Outside CT 7/10/2023, intraprocedural fluoroscopy  images 8/7/2023    TECHNIQUE: CT scan of the abdomen and pelvis was performed on  multidetector CT scanner using volumetric acquisition technique and  images were reconstructed in multiple planes with variable thickness  and reviewed on dedicated workstations.     CONTRAST: Isovue 370 110 mL injected IV without oral contrast    CT scan radiation dose is optimized to minimum requisite dose using  automated dose modulation techniques.    FINDINGS:    Lower thorax: Minimal bibasilar atelectasis. No consolidation, pleural  effusion or pneumothorax.    Hepatobiliary: Normal enhancement of the hepatic parenchyma. No  suspicious focal hepatic lesion. New mild intra and moderate  extrahepatic biliary dilation, common duct measures up to 10 mm,  previously measuring up to 6 mm (series 6, image 41). Gallbladder  is  surgically absent.    Pancreas: Diffuse pancreatic parenchymal atrophy likely changes of  chronic pancreatitis. Significant interval decrease in size of the  pancreatic pseudocyst now measuring approximately 2.2 x 5.7 cm  compared to 4.6 x 7.6 cm on prior exam 07/10/2023 (when measured on  coronal series 6, image 34). Pseudocyst appears to be located within  or about the anterior margin of the pancreatic head, neck body and  proximal tail and obscures the main pancreatic duct at this level.  Mild pancreatic ductal dilation. The pancreatic tail. Pseudocyst now  contains fluid and locules of air, likely postprocedural. Double  pigtail cystogastrostomy tube stent with loops identified within the  stomach and superior margin of the pancreatic pseudocyst present  series 6, image  34-43).    Adrenal glands: No mass or nodules    Spleen: Normal.    Genitourinary: No suspicious renal mass, obstructing calculus or  hydronephrosis.  Normal appearance of the urinary bladder and prostate  gland.    Gastrointestinal tract : Diffuse gastric wall thickening. No small or  large bowel dilation. Colonic diverticulosis without surrounding  inflammatory changes to suggest acute diverticulitis. Appendectomy.    Mesentery/peritoneum/retroperitoneum: No free fluid or air.  Trace  mesenteric stranding about the mesenteric vein-portal vein confluence,  similar to prior and likely reactive.    Lymph nodes: No new or large abdominopelvic nodes identified.  Scattered subcentimeter retroperitoneal nodes. For example, stable  subcentimeter aortocaval node (series 4, image 193, 209).    Vasculature: Patent major abdominal vasculature.  Normal caliber  abdominal aorta.    Osseous structures: No aggressive or acute osseous lesion.   Degenerative changes of the visualized spine. Schmorl's node  deformities most prominent at T11.      Soft tissues: Tiny fat-containing umbilical hernia. Partially  visualized gynecomastia.      Impression     IMPRESSION:     1. Significant decrease in size of the pancreatic pseudocyst with  interval placement of an appropriately positioned double pigtail  cystogastrostomy stent.   2. New mild intra and moderate extrahepatic biliary dilation with the  common duct measuring up to 10 mm compared to 6 mm on prior exam  07/10/2023. Recommend correlation with clinical symptoms and  laboratory findings.  3. Sequela of chronic pancreatitis with substantial pancreatic  atrophy. Limited evaluation of the main pancreatic duct only  pancreatic head, neck, body and tail due to pancreatic pseudocyst.  Presence of a disrupted pancreatic duct to be difficult to exclude.    I have personally reviewed the examination and initial interpretation  and I agree with the findings.    OPAL HERBERT MD         SYSTEM ID:  [id number]       Discharge Medications   Discharge Medication List as of 8/9/2023 11:45 AM        START taking these medications    Details   cefdinir (OMNICEF) 300 MG capsule Take 1 capsule (300 mg) by mouth 2 times daily, Disp-10 capsule, R-0, E-Prescribe      oxyCODONE (ROXICODONE) 5 MG tablet Take 1 tablet (5 mg) by mouth every 6 hours as needed for pain, Disp-20 tablet, R-0, E-Prescribe           CONTINUE these medications which have CHANGED    Details   warfarin ANTICOAGULANT (COUMADIN) 5 MG tablet Take 1 tablet (5 mg) by mouth, Historical           CONTINUE these medications which have NOT CHANGED    Details   amLODIPine (NORVASC) 2.5 MG tablet Take 1 tablet (2.5 mg) by mouth 1 time per day, Historical      CREON 6000-62777 units CPEP per EC capsule Take 1 capsule by mouth 3 times a day with meals, PRATEEK, Historical      diltiazem ER COATED BEADS (CARDIZEM CD/CARTIA XT) 120 MG 24 hr capsule Take 120 mg by mouth daily, Historical      folic acid (FOLVITE) 1 MG tablet Take 1 tablet (1 mg) by mouth 1 time per day, Historical      gabapentin (NEURONTIN) 600 MG tablet Take 1,200 mg by mouth 2 times daily, Historical       magnesium oxide (MAG-OX) 400 MG tablet Take 400 mg by mouth daily, Historical      metFORMIN (GLUCOPHAGE XR) 500 MG 24 hr tablet Take 500 mg by mouth, Historical      multivitamin w/minerals (MULTI-VITAMIN) tablet Take 1 tablet by mouth daily, Historical      blood glucose (ONETOUCH VERIO IQ) test strip 1 strip, Historical           STOP taking these medications       ciprofloxacin (CIPRO) 500 MG tablet Comments:   Reason for Stopping:             Allergies   Allergies   Allergen Reactions    Amoxicillin Hives    Strawberry Extract Hives    Levofloxacin Rash     Arthralgia, rash, fever     Chlorhexidine Itching     Itching to chest ad abdomen after using pre op wipes

## 2023-08-09 NOTE — PLAN OF CARE
Vitals:    08/08/23 0815 08/08/23 1548 08/08/23 2333 08/09/23 0744   BP: 122/70 101/58 102/45 115/69   BP Location: Right arm Right arm Right arm Right arm   Pulse: 56 80 71 53   Resp: 16 16 16 16   Temp: 98.4  F (36.9  C) 98.1  F (36.7  C) 98.2  F (36.8  C) 98.2  F (36.8  C)   TempSrc: Oral Oral Oral Oral   SpO2: 97%  94% 97%   Weight:       Height:       Vitally stable, sating 97% on room air, non labor breathing, lungs clear, +BS passing gas,  Voiding adequate,  denies pan, tolerating regular diet, discharge script written, teaching has been done.  A copy of discharge given to pt, pt discharge home .

## 2023-08-14 ENCOUNTER — PATIENT OUTREACH (OUTPATIENT)
Dept: GASTROENTEROLOGY | Facility: CLINIC | Age: 63
End: 2023-08-14
Payer: COMMERCIAL

## 2023-08-14 NOTE — PROGRESS NOTES
Follow up: Post EUS on 8/7/23 with Dr. Victoria.     Post procedure recommendations:    - Observe patient in same day observation unit for possible discharge same day.   - Resume Coumadin (warfarin) at prior dose in 3 days.   - Will recommend CT abdomen/pelvis with IV contrast to re-evaluate for residual pseudocyst in 2 months followed by pancreas clinic follow up.   - Will recommend antibiotics for 1 week   - The findings and recommendations were discussed with the patient and patient's family.     Patient was hospitalized following the procedure with post ERCP pancreatitis from 8/7-8/9.     Patient states: Patient reports that he was discharged from the hospital while still in pain. Reports that he was given oxycodone for pain, but has not taken it as he doesn't want to be on pain medication. He states that he has eaten nothing but a bowel of cereal since discharge. Denies fevers. Reddened area on arm surrounding prior IV site, reports he showed PCP at clinic visit yesterday. Reports he also discussed his pain and poor intake with PCP.     Patient is agitated throughout our conversation. Speaking quickly and loudly. He is upset. He had a number of concerns with his care while inpatient including lack of responsiveness from staff, poor communication from providers and overall, states that he did not feel that anyone cared about him. Writer listened to patient's concerns and apologized that he had a negative experience. Provided patient with contact information for Patient Relations and encouraged him to call to share his concerns with their team.     Orders placed: None at at this time as patient repeatedly states that he will never come back here for care. Discussed Dr. Victoria's recommendations for CT scan and return to clinic in 2 months. Patient reports that he discussed this with his PCP who stated they would order the CT scan. He intends to follow up at [facility] moving forward.     Clinic contact  number verified for future questions/concerns. Dr. Victoria notified.      Aislinn Hilario, RN Care Coordinator

## 2024-01-18 NOTE — ANESTHESIA PREPROCEDURE EVALUATION
Anesthesia Pre-Procedure Evaluation    Patient: Luis Davis   MRN: [id number] : 1960        Procedure : Procedure(s):  ENDOSCOPIC ULTRASOUND, ESOPHAGOSCOPY / UPPER GASTROINTESTINAL TRACT (GI)          Past Medical History:   Diagnosis Date    Acute pancreatitis     Alcohol abuse     Antiplatelet or antithrombotic long-term use     Cholangitis     Diabetes (H)     Hypertension     Neuropathy     Obese     Pancreatic pseudocyst     Paroxysmal atrial fibrillation (H)       Past Surgical History:   Procedure Laterality Date    GASTROSTOMY        Allergies   Allergen Reactions    Amoxicillin Hives    Strawberry Extract Hives    Levofloxacin Rash     Arthralgia, rash, fever     Chlorhexidine Itching     Itching to chest ad abdomen after using pre op wipes      Social History     Tobacco Use    Smoking status: Former     Types: Dip, chew, snus or snuff    Smokeless tobacco: Former    Tobacco comments:     Quit    Substance Use Topics    Alcohol use: Not Currently     Comment: Quit 5-6 years ago (23 today's date)      Wt Readings from Last 1 Encounters:   23 85.8 kg (189 lb 2.5 oz)        Anesthesia Evaluation   Pt has had prior anesthetic.     History of anesthetic complications  - PONV.      ROS/MED HX  ENT/Pulmonary:  - neg pulmonary ROS     Neurologic:     (+)    peripheral neuropathy, - peripheral , mostly feet.                           Cardiovascular: Comment: Coumadin- last  (AF)    (+)  hypertension- -   -  - -   Taking blood thinners                       Irregular Heartbeat/Palpitations,            METS/Exercise Tolerance:     Hematologic:  - neg hematologic  ROS     Musculoskeletal:  - neg musculoskeletal ROS     GI/Hepatic:       Renal/Genitourinary:       Endo:     (+)  type II DM,             Obesity,       Psychiatric/Substance Use:       Infectious Disease:  - neg infectious disease ROS     Malignancy:       Other:            Physical Exam    Airway        Mallampati: II   TM  No protocol for requested medication.    Medication: HYDROcodone-acetaminophen (NORCO)  MG per tablet   Last office visit date: 11/20/2023  Pharmacy: [facility] #[id number] [place] [address] - [address]    Order pended, routed to clinician for review.     No protocol for requested medication.    Medication: ALPRAZolam (XANAX) 2 MG tablet   Last office visit date: 11/20/2023  Pharmacy: [facility] #[id number] [place] [address] - [address]    Order pended, routed to clinician for review.    distance: > 3 FB   Neck ROM: full   Mouth opening: > 3 cm    Respiratory Devices and Support         Dental       (+) Multiple crowns, permanant bridges      Cardiovascular   cardiovascular exam normal          Pulmonary   pulmonary exam normal                OUTSIDE LABS:  CBC: No results found for: WBC, HGB, HCT, PLT  BMP:   Lab Results   Component Value Date     (H) 08/07/2023     COAGS:   Lab Results   Component Value Date    INR 2.5 07/10/2023     POC: No results found for: BGM, HCG, HCGS  HEPATIC: No results found for: ALBUMIN, PROTTOTAL, ALT, AST, GGT, ALKPHOS, BILITOTAL, BILIDIRECT, MARCIN  OTHER: No results found for: PH, LACT, A1C, JASVIR, PHOS, MAG, LIPASE, AMYLASE, TSH, T4, T3, CRP, SED    Anesthesia Plan    ASA Status:  3    NPO Status:  NPO Appropriate    Anesthesia Type: General.     - Airway: ETT   Induction: Intravenous.           Consents    Anesthesia Plan(s) and associated risks, benefits, and realistic alternatives discussed. Questions answered and patient/representative(s) expressed understanding.     - Discussed: Risks, Benefits and Alternatives for BOTH SEDATION and the PROCEDURE were discussed     - Discussed with:  Spouse, Patient      - Extended Intubation/Ventilatory Support Discussed: No.      - Patient is DNR/DNI Status: No     Use of blood products discussed: No .     Postoperative Care       PONV prophylaxis: Ondansetron (or other 5HT-3), Dexamethasone or Solumedrol, Scopolamine patch     Comments:                LULY BOSTON MD

## 2024-11-22 NOTE — ANESTHESIA POSTPROCEDURE EVALUATION
"Patient: Luis Davis    Procedure: Procedure(s):  ENDOSCOPIC ULTRASOUND, CYST GASTROSTOMY WITH STENT PLACEMENT       Anesthesia Type:  General    Note:  Disposition: Outpatient   Postop Pain Control: Challenging            Challenges/Interventions: Exacerbation of chronic pain            Sign Out: ONGOING pain issues   PONV: No   Neuro/Psych: Uneventful            Sign Out: Acceptable/Baseline neuro status   Airway/Respiratory: Uneventful            Sign Out: Acceptable/Baseline resp. status   CV/Hemodynamics: Uneventful            Sign Out: Acceptable CV status; No obvious hypovolemia; No obvious fluid overload   Other NRE: NONE   DID A NON-ROUTINE EVENT OCCUR? No    Event details/Postop Comments:  Baseline pain 3/10. Hurts \"like someone is pinching me...same as last time\" 7/10 pain despite dilaudid. Belly was soft. VSS. Patient to CT per GI. Will potentially stay overnight for observation pending pain control and results of CT.            Last vitals:  Vitals Value Taken Time   /80 08/07/23 1615   Temp     Pulse 53 08/07/23 1626   Resp 0 08/07/23 1626   SpO2 100 % 08/07/23 1626   Vitals shown include unvalidated device data.    Electronically Signed By: Hussain Jimenez MD  August 7, 2023  4:27 PM  " Urinary tract infection

## (undated) DEVICE — GLOVE BIOGEL PI ULTRATOUCH G SZ 7.5 42175

## (undated) DEVICE — CATH RETRIEVAL BALLOON EXTRACTOR PRO RX-S INJ ABOVE 9-12MM

## (undated) DEVICE — SUCTION MANIFOLD NEPTUNE 2 SYS 4 PORT 0702-020-000

## (undated) DEVICE — WIRE GUIDE ROADRUNNER X-SUPPORT .018X480CM STR TIP G22419

## (undated) DEVICE — ENDO CATH BALLOON DILATION HURRICANE 06MMX4X180CM M00545920

## (undated) DEVICE — CYSTOTOME 10FRX165CM

## (undated) DEVICE — ENDO FUSION OMNI-TOME G31903

## (undated) DEVICE — ENDO TUBING CO2 SMARTCAP STERILE DISP 100145CO2EXT

## (undated) DEVICE — SOL NACL 0.9% IRRIG 1000ML BOTTLE 2F7124

## (undated) DEVICE — INFLATION DEVICE BIG 60 ENDO-AN6012

## (undated) DEVICE — SPECIMEN CONTAINER 3OZ W/FORMALIN 59901

## (undated) DEVICE — KIT ENDO FIRST STEP DISINFECTANT 200ML W/POUCH EP-4

## (undated) DEVICE — PACK ENDOSCOPY GI CUSTOM UMMC

## (undated) DEVICE — ENDO BITE BLOCK ADULT OMNI-BLOC

## (undated) DEVICE — WIRE GUIDE 0.025"X450CM ANG VISIGLIDE G-240-2545A

## (undated) DEVICE — STENT BILIARY ZIMMON DOUBLE PIGTAIL 7FRX2CM G22438: Type: IMPLANTABLE DEVICE | Site: STOMACH | Status: NON-FUNCTIONAL

## (undated) DEVICE — LABEL MEDICATION SYSTEM 3303-P

## (undated) RX ORDER — HYDROMORPHONE HCL IN WATER/PF 6 MG/30 ML
PATIENT CONTROLLED ANALGESIA SYRINGE INTRAVENOUS
Status: DISPENSED
Start: 2023-08-07

## (undated) RX ORDER — SODIUM CHLORIDE, SODIUM LACTATE, POTASSIUM CHLORIDE, CALCIUM CHLORIDE 600; 310; 30; 20 MG/100ML; MG/100ML; MG/100ML; MG/100ML
INJECTION, SOLUTION INTRAVENOUS
Status: DISPENSED
Start: 2023-08-07

## (undated) RX ORDER — SCOLOPAMINE TRANSDERMAL SYSTEM 1 MG/1
PATCH, EXTENDED RELEASE TRANSDERMAL
Status: DISPENSED
Start: 2023-08-07

## (undated) RX ORDER — ONDANSETRON 2 MG/ML
INJECTION INTRAMUSCULAR; INTRAVENOUS
Status: DISPENSED
Start: 2023-08-07

## (undated) RX ORDER — FENTANYL CITRATE 50 UG/ML
INJECTION, SOLUTION INTRAMUSCULAR; INTRAVENOUS
Status: DISPENSED
Start: 2023-08-07

## (undated) RX ORDER — EPHEDRINE SULFATE 50 MG/ML
INJECTION, SOLUTION INTRAMUSCULAR; INTRAVENOUS; SUBCUTANEOUS
Status: DISPENSED
Start: 2023-08-07

## (undated) RX ORDER — PROPOFOL 10 MG/ML
INJECTION, EMULSION INTRAVENOUS
Status: DISPENSED
Start: 2023-08-07